# Patient Record
Sex: MALE | Race: WHITE | NOT HISPANIC OR LATINO | URBAN - METROPOLITAN AREA
[De-identification: names, ages, dates, MRNs, and addresses within clinical notes are randomized per-mention and may not be internally consistent; named-entity substitution may affect disease eponyms.]

---

## 2018-11-04 ENCOUNTER — HOSPITAL ENCOUNTER (OUTPATIENT)
Dept: URGENT CARE | Facility: CLINIC | Age: 28
Discharge: HOME | End: 2018-11-04
Attending: FAMILY MEDICINE | Admitting: FAMILY MEDICINE

## 2019-08-14 ENCOUNTER — OFFICE VISIT (OUTPATIENT)
Dept: FAMILY MEDICINE CLINIC | Facility: CLINIC | Age: 29
End: 2019-08-14

## 2019-08-14 VITALS
OXYGEN SATURATION: 98 % | HEART RATE: 58 BPM | SYSTOLIC BLOOD PRESSURE: 124 MMHG | HEIGHT: 72 IN | DIASTOLIC BLOOD PRESSURE: 84 MMHG | BODY MASS INDEX: 22.48 KG/M2 | RESPIRATION RATE: 16 BRPM | TEMPERATURE: 98.3 F | WEIGHT: 166 LBS

## 2019-08-14 DIAGNOSIS — K13.70 ORAL LESION: Primary | ICD-10-CM

## 2019-08-14 PROBLEM — R00.2 PALPITATIONS: Status: ACTIVE | Noted: 2018-11-12

## 2019-08-14 PROBLEM — J30.2 SEASONAL ALLERGIES: Status: ACTIVE | Noted: 2018-12-04

## 2019-08-14 PROBLEM — J45.909 ASTHMA: Status: ACTIVE | Noted: 2018-12-04

## 2019-08-14 PROBLEM — R06.02 EXERTIONAL SHORTNESS OF BREATH: Status: ACTIVE | Noted: 2018-11-12

## 2019-08-14 PROCEDURE — 99213 OFFICE O/P EST LOW 20 MIN: CPT | Performed by: NURSE PRACTITIONER

## 2019-08-14 RX ORDER — MONTELUKAST SODIUM 10 MG/1
TABLET ORAL
Refills: 11 | COMMUNITY
Start: 2019-08-10 | End: 2019-12-01 | Stop reason: SDUPTHER

## 2019-08-14 RX ORDER — ALBUTEROL SULFATE 90 UG/1
AEROSOL, METERED RESPIRATORY (INHALATION)
Refills: 9 | COMMUNITY
Start: 2019-08-10 | End: 2019-09-01 | Stop reason: SDUPTHER

## 2019-08-14 RX ORDER — FAMCICLOVIR 500 MG/1
500 TABLET ORAL 3 TIMES DAILY
Qty: 21 TABLET | Refills: 1 | Status: SHIPPED | OUTPATIENT
Start: 2019-08-14 | End: 2019-08-21

## 2019-08-14 RX ORDER — BUDESONIDE AND FORMOTEROL FUMARATE DIHYDRATE 160; 4.5 UG/1; UG/1
AEROSOL RESPIRATORY (INHALATION)
Refills: 11 | COMMUNITY
Start: 2019-08-10 | End: 2020-01-03

## 2019-08-14 NOTE — PROGRESS NOTES
Chief Complaint   Patient presents with   • Mouth Lesions        Subjective   Arnav Styles is a 29 y.o. male who presents today for pt has lesion on his tongue x 1 week     HPI:  Pt has lesions on central tongue x 1 week.  He takes drops for annual instead of injection due to his high number of allergies.  He has no other issues.   He has stopped the allergy shot because of the lesion.  He is worried due to father family history of oral cancer.  He uses snuff.       Arnav Styles  has a past medical history of Abnormal Holter monitor finding, Asthma, and Palpitations.    No Known Allergies    Current Outpatient Medications:   •  albuterol sulfate  (90 Base) MCG/ACT inhaler, USE 2 PUFFS Q 4 H PRN FOR SHORTNESS OF AIR, Disp: , Rfl: 9  •  montelukast (SINGULAIR) 10 MG tablet, TK 1 T PO QPM, Disp: , Rfl: 11  •  SYMBICORT 160-4.5 MCG/ACT inhaler, USE 2 PUFFS BID, Disp: , Rfl: 11  •  famciclovir (FAMVIR) 500 MG tablet, Take 1 tablet by mouth 3 (Three) Times a Day for 7 days., Disp: 21 tablet, Rfl: 1  Past Medical History:   Diagnosis Date   • Abnormal Holter monitor finding    • Asthma    • Palpitations      Past Surgical History:   Procedure Laterality Date   • TONSILLECTOMY       Social History     Socioeconomic History   • Marital status: Single     Spouse name: Not on file   • Number of children: Not on file   • Years of education: Not on file   • Highest education level: Not on file   Tobacco Use   • Smoking status: Never Smoker   • Smokeless tobacco: Former User   Substance and Sexual Activity   • Alcohol use: No     Frequency: Never   • Drug use: No     Family History   Problem Relation Age of Onset   • Throat cancer Father        Family history, surgical history, past medical history, Allergies and med's reviewed with patient today and updated in [facility] EMR.   PHQ-2 Depression Screening  Little interest or pleasure in doing things? 0   Feeling down, depressed, or hopeless? 0   PHQ-2 Total Score 0   PHQ-9  "Depression Screening  Little interest or pleasure in doing things? 0   Feeling down, depressed, or hopeless? 0   Trouble falling or staying asleep, or sleeping too much?     Feeling tired or having little energy?     Poor appetite or overeating?     Feeling bad about yourself - or that you are a failure or have let yourself or your family down?     Trouble concentrating on things, such as reading the newspaper or watching television?     Moving or speaking so slowly that other people could have noticed? Or the opposite - being so fidgety or restless that you have been moving around a lot more than usual?     Thoughts that you would be better off dead, or of hurting yourself in some way?     PHQ-9 Total Score 0   If you checked off any problems, how difficult have these problems made it for you to do your work, take care of things at home, or get along with other people?       ROS:  Review of Systems   Constitutional: Negative for fatigue and fever.   HENT: Positive for mouth sores. Negative for ear discharge, sinus pain and sore throat.    Respiratory: Negative for cough.    Cardiovascular: Negative for chest pain.   Gastrointestinal: Negative for abdominal pain.       OBJECTIVE:  Vitals:    08/14/19 0820   BP: 124/84   BP Location: Left arm   Patient Position: Sitting   Cuff Size: Adult   Pulse: 58   Resp: 16   Temp: 98.3 °F (36.8 °C)   TempSrc: Oral   SpO2: 98%   Weight: 75.3 kg (166 lb)   Height: 181.6 cm (71.5\")     Physical Exam   Constitutional: He appears well-developed.   HENT:   Head: Normocephalic.   Right Ear: Hearing, tympanic membrane and ear canal normal.   Left Ear: Hearing, tympanic membrane and ear canal normal.   Mouth/Throat: Uvula is midline and mucous membranes are normal.   Central tongue with 2 3 mm lesions white   Eyes: Conjunctivae are normal. Pupils are equal, round, and reactive to light.   Neck: Normal range of motion. Neck supple.   Cardiovascular: Regular rhythm and normal heart sounds. " Exam reveals no gallop and no friction rub.   No murmur heard.  Pulmonary/Chest: Effort normal and breath sounds normal. He has no wheezes. He has no rales.   Abdominal: Soft. Bowel sounds are normal. He exhibits no distension and no mass. There is no tenderness. There is no guarding.       ASSESSMENT/ PLAN:    Arnav was seen today for mouth lesions.    Diagnoses and all orders for this visit:    Oral lesion  Comments:  will try famivir  needs to let allergist know about area and stopping meds  if not better 2 weeks oral surg consult    Other orders  -     famciclovir (FAMVIR) 500 MG tablet; Take 1 tablet by mouth 3 (Three) Times a Day for 7 days.        Orders Placed Today:     New Medications Ordered This Visit   Medications   • famciclovir (FAMVIR) 500 MG tablet     Sig: Take 1 tablet by mouth 3 (Three) Times a Day for 7 days.     Dispense:  21 tablet     Refill:  1        Management Plan:     An After Visit Summary was printed and given to the patient at discharge.    Follow-up: Return in about 1 year (around 8/14/2020).    CHIKA Cote 8/14/2019 11:12 AM  This note was electronically signed.

## 2019-09-25 ENCOUNTER — OFFICE VISIT (OUTPATIENT)
Dept: FAMILY MEDICINE CLINIC | Facility: CLINIC | Age: 29
End: 2019-09-25

## 2019-09-25 VITALS
BODY MASS INDEX: 23.16 KG/M2 | SYSTOLIC BLOOD PRESSURE: 118 MMHG | HEIGHT: 72 IN | OXYGEN SATURATION: 97 % | RESPIRATION RATE: 16 BRPM | HEART RATE: 74 BPM | WEIGHT: 171 LBS | DIASTOLIC BLOOD PRESSURE: 81 MMHG | TEMPERATURE: 98.1 F

## 2019-09-25 DIAGNOSIS — B35.9 TINEA: Primary | ICD-10-CM

## 2019-09-25 PROCEDURE — 99212 OFFICE O/P EST SF 10 MIN: CPT | Performed by: NURSE PRACTITIONER

## 2019-09-25 NOTE — PROGRESS NOTES
Chief Complaint   Patient presents with   • Rash     on abdomen        Subjective   Arnav Styles is a 29 y.o. male who presents today for rash    HPI:  Pt has a rash on abd where he wears his vest and sweats.  It itches.  He had one on his side a while back but it resolved.  He has been using benadryl cream on it.   No other symptoms    Arnav Styles  has a past medical history of Abnormal Holter monitor finding, Asthma, and Palpitations.    No Known Allergies    Current Outpatient Medications:   •  montelukast (SINGULAIR) 10 MG tablet, TK 1 T PO QPM, Disp: , Rfl: 11  •  PROAIR  (90 Base) MCG/ACT inhaler, INHALE 2 PUFFS BY MOUTH EVERY 4 TO 6 HOURS AS NEEDED FOR SHORTNESS OF BREATH., Disp: 8.5 g, Rfl: 11  •  SYMBICORT 160-4.5 MCG/ACT inhaler, USE 2 PUFFS BID, Disp: , Rfl: 11  Past Medical History:   Diagnosis Date   • Abnormal Holter monitor finding    • Asthma    • Palpitations      Past Surgical History:   Procedure Laterality Date   • TONSILLECTOMY       Social History     Socioeconomic History   • Marital status: Single     Spouse name: Not on file   • Number of children: Not on file   • Years of education: Not on file   • Highest education level: Not on file   Tobacco Use   • Smoking status: Never Smoker   • Smokeless tobacco: Former User   Substance and Sexual Activity   • Alcohol use: No     Frequency: Never   • Drug use: No     Family History   Problem Relation Age of Onset   • Throat cancer Father        Family history, surgical history, past medical history, Allergies and med's reviewed with patient today and updated in [facility] EMR.   PHQ-2 Depression Screening  Little interest or pleasure in doing things?     Feeling down, depressed, or hopeless?     PHQ-2 Total Score     PHQ-9 Depression Screening  Little interest or pleasure in doing things?     Feeling down, depressed, or hopeless?     Trouble falling or staying asleep, or sleeping too much?     Feeling tired or having little energy?     Poor  "appetite or overeating?     Feeling bad about yourself - or that you are a failure or have let yourself or your family down?     Trouble concentrating on things, such as reading the newspaper or watching television?     Moving or speaking so slowly that other people could have noticed? Or the opposite - being so fidgety or restless that you have been moving around a lot more than usual?     Thoughts that you would be better off dead, or of hurting yourself in some way?     PHQ-9 Total Score     If you checked off any problems, how difficult have these problems made it for you to do your work, take care of things at home, or get along with other people?       ROS:  Review of Systems   Constitutional: Negative for fatigue and fever.   Skin: Positive for rash.       OBJECTIVE:  Vitals:    09/25/19 1027   BP: 118/81   BP Location: Right arm   Patient Position: Sitting   Cuff Size: Adult   Pulse: 74   Resp: 16   Temp: 98.1 °F (36.7 °C)   TempSrc: Oral   SpO2: 97%   Weight: 77.6 kg (171 lb)   Height: 181.6 cm (71.5\")     Physical Exam   Skin: Skin is warm and dry.   3 inch [place] pink raise rash   Nursing note and vitals reviewed.      ASSESSMENT/ PLAN:    Arnav was seen today for rash.    Diagnoses and all orders for this visit:    Tinea  Comments:  lotrimin bid        Orders Placed Today:     No orders of the defined types were placed in this encounter.       Management Plan:     An After Visit Summary was printed and given to the patient at discharge.    Follow-up: Return if symptoms worsen or fail to improve.    CHIKA Cote 9/25/2019 10:57 AM  This note was electronically signed.    "

## 2019-11-13 ENCOUNTER — OFFICE VISIT (OUTPATIENT)
Dept: FAMILY MEDICINE CLINIC | Facility: CLINIC | Age: 29
End: 2019-11-13

## 2019-11-13 VITALS
TEMPERATURE: 98 F | BODY MASS INDEX: 23.7 KG/M2 | WEIGHT: 175 LBS | DIASTOLIC BLOOD PRESSURE: 89 MMHG | HEART RATE: 70 BPM | OXYGEN SATURATION: 97 % | SYSTOLIC BLOOD PRESSURE: 134 MMHG | HEIGHT: 72 IN | RESPIRATION RATE: 16 BRPM

## 2019-11-13 DIAGNOSIS — R53.83 LOW ENERGY: ICD-10-CM

## 2019-11-13 DIAGNOSIS — F43.21 GRIEF: ICD-10-CM

## 2019-11-13 DIAGNOSIS — R53.83 OTHER FATIGUE: Primary | ICD-10-CM

## 2019-11-13 DIAGNOSIS — F41.9 ANXIETY: ICD-10-CM

## 2019-11-13 PROCEDURE — 99213 OFFICE O/P EST LOW 20 MIN: CPT | Performed by: NURSE PRACTITIONER

## 2019-11-13 RX ORDER — BUSPIRONE HYDROCHLORIDE 5 MG/1
5 TABLET ORAL 2 TIMES DAILY
Qty: 60 TABLET | Refills: 1 | Status: SHIPPED | OUTPATIENT
Start: 2019-11-13 | End: 2020-02-10

## 2019-11-13 NOTE — PROGRESS NOTES
Chief Complaint   Patient presents with   • Fatigue   • Insomnia        Subjective   Arnav Styles is a 29 y.o. male who presents today for low energy and anxiety    HPI:  Pt has notice that he does not have the energy level that he normally does over the past few month.  Last labs were over one yr ago.  He is still working out and eating healthy.  He travels and swings shift a lot.   He will like labs to check ordered.    Pt also has been having some increase in anxiety after losing his father and grandfather in recent months.  He is doing better than he was 6 months ago.  But he has anxiety in crowds or when he has a day off.  He would like to try something for anxiety that he has little side effects.       Arnav Styles  has a past medical history of Abnormal Holter monitor finding, Asthma, and Palpitations.    No Known Allergies    Current Outpatient Medications:   •  montelukast (SINGULAIR) 10 MG tablet, TK 1 T PO QPM, Disp: , Rfl: 11  •  PROAIR  (90 Base) MCG/ACT inhaler, INHALE 2 PUFFS BY MOUTH EVERY 4 TO 6 HOURS AS NEEDED FOR SHORTNESS OF BREATH., Disp: 8.5 g, Rfl: 11  •  SYMBICORT 160-4.5 MCG/ACT inhaler, USE 2 PUFFS BID, Disp: , Rfl: 11  •  busPIRone (BUSPAR) 5 MG tablet, Take 1 tablet by mouth 2 (Two) Times a Day., Disp: 60 tablet, Rfl: 1  Past Medical History:   Diagnosis Date   • Abnormal Holter monitor finding    • Asthma    • Palpitations      Past Surgical History:   Procedure Laterality Date   • TONSILLECTOMY       Social History     Socioeconomic History   • Marital status: Single     Spouse name: Not on file   • Number of children: Not on file   • Years of education: Not on file   • Highest education level: Not on file   Tobacco Use   • Smoking status: Never Smoker   • Smokeless tobacco: Former User   Substance and Sexual Activity   • Alcohol use: No     Frequency: Never   • Drug use: No     Family History   Problem Relation Age of Onset   • Throat cancer Father        Family history,  "surgical history, past medical history, Allergies and med's reviewed with patient today and updated in [facility] EMR.   PHQ-2 Depression Screening  Little interest or pleasure in doing things?     Feeling down, depressed, or hopeless?     PHQ-2 Total Score     PHQ-9 Depression Screening  Little interest or pleasure in doing things?     Feeling down, depressed, or hopeless?     Trouble falling or staying asleep, or sleeping too much?     Feeling tired or having little energy?     Poor appetite or overeating?     Feeling bad about yourself - or that you are a failure or have let yourself or your family down?     Trouble concentrating on things, such as reading the newspaper or watching television?     Moving or speaking so slowly that other people could have noticed? Or the opposite - being so fidgety or restless that you have been moving around a lot more than usual?     Thoughts that you would be better off dead, or of hurting yourself in some way?     PHQ-9 Total Score     If you checked off any problems, how difficult have these problems made it for you to do your work, take care of things at home, or get along with other people?       ROS:  Review of Systems   Constitutional: Positive for fatigue. Negative for chills and fever.   Respiratory: Negative for cough and shortness of breath.    Cardiovascular: Negative for chest pain.   Psychiatric/Behavioral: The patient is nervous/anxious.        OBJECTIVE:  Vitals:    11/13/19 1337   BP: 134/89   BP Location: Left arm   Patient Position: Sitting   Cuff Size: Adult   Pulse: 70   Resp: 16   Temp: 98 °F (36.7 °C)   TempSrc: Oral   SpO2: 97%   Weight: 79.4 kg (175 lb)   Height: 181.6 cm (71.5\")     Physical Exam   Cardiovascular: Normal rate, regular rhythm, normal heart sounds and intact distal pulses. Exam reveals no gallop and no friction rub.   No murmur heard.  Pulmonary/Chest: Effort normal and breath sounds normal. He has no wheezes.   Abdominal: Soft. Bowel sounds " are normal.   Skin: Skin is warm and dry.   Psychiatric: He has a normal mood and affect. His behavior is normal. Judgment and thought content normal.   Nursing note and vitals reviewed.      ASSESSMENT/ PLAN:    Arnav was seen today for fatigue and insomnia.    Diagnoses and all orders for this visit:    Other fatigue  -     CBC Auto Differential; Future  -     Comprehensive Metabolic Panel; Future  -     TSH; Future  -     Testosterone; Future    Grief    Anxiety    Low energy  -     CBC Auto Differential; Future  -     Comprehensive Metabolic Panel; Future  -     TSH; Future  -     Testosterone; Future    Other orders  -     busPIRone (BUSPAR) 5 MG tablet; Take 1 tablet by mouth 2 (Two) Times a Day.        Orders Placed Today:     New Medications Ordered This Visit   Medications   • busPIRone (BUSPAR) 5 MG tablet     Sig: Take 1 tablet by mouth 2 (Two) Times a Day.     Dispense:  60 tablet     Refill:  1        Management Plan:     An After Visit Summary was printed and given to the patient at discharge.    Follow-up: Return in about 4 weeks (around 12/11/2019), or if symptoms worsen or fail to improve.    Christel Garcia, CHIKA 11/13/2019 2:27 PM  This note was electronically signed.

## 2019-11-17 LAB
ALBUMIN SERPL-MCNC: 4.6 G/DL (ref 3.5–5.5)
ALBUMIN/GLOB SERPL: 2.4 {RATIO} (ref 1.2–2.2)
ALP SERPL-CCNC: 82 IU/L (ref 39–117)
ALT SERPL-CCNC: 21 IU/L (ref 0–44)
AST SERPL-CCNC: 15 IU/L (ref 0–40)
BASOPHILS # BLD AUTO: 0 X10E3/UL (ref 0–0.2)
BASOPHILS NFR BLD AUTO: 1 %
BILIRUB SERPL-MCNC: 1.1 MG/DL (ref 0–1.2)
BUN SERPL-MCNC: 17 MG/DL (ref 6–20)
BUN/CREAT SERPL: 14 (ref 9–20)
CALCIUM SERPL-MCNC: 9.4 MG/DL (ref 8.7–10.2)
CHLORIDE SERPL-SCNC: 103 MMOL/L (ref 96–106)
CO2 SERPL-SCNC: 23 MMOL/L (ref 20–29)
CREAT SERPL-MCNC: 1.18 MG/DL (ref 0.76–1.27)
EOSINOPHIL # BLD AUTO: 0.2 X10E3/UL (ref 0–0.4)
EOSINOPHIL NFR BLD AUTO: 3 %
ERYTHROCYTE [DISTWIDTH] IN BLOOD BY AUTOMATED COUNT: 12.7 % (ref 12.3–15.4)
GLOBULIN SER CALC-MCNC: 1.9 G/DL (ref 1.5–4.5)
GLUCOSE SERPL-MCNC: 101 MG/DL (ref 65–99)
HCT VFR BLD AUTO: 45.7 % (ref 37.5–51)
HGB BLD-MCNC: 16.2 G/DL (ref 13–17.7)
IMM GRANULOCYTES # BLD AUTO: 0 X10E3/UL (ref 0–0.1)
IMM GRANULOCYTES NFR BLD AUTO: 0 %
LYMPHOCYTES # BLD AUTO: 2.2 X10E3/UL (ref 0.7–3.1)
LYMPHOCYTES NFR BLD AUTO: 39 %
MCH RBC QN AUTO: 30.5 PG (ref 26.6–33)
MCHC RBC AUTO-ENTMCNC: 35.4 G/DL (ref 31.5–35.7)
MCV RBC AUTO: 86 FL (ref 79–97)
MONOCYTES # BLD AUTO: 0.5 X10E3/UL (ref 0.1–0.9)
MONOCYTES NFR BLD AUTO: 9 %
NEUTROPHILS # BLD AUTO: 2.7 X10E3/UL (ref 1.4–7)
NEUTROPHILS NFR BLD AUTO: 48 %
PLATELET # BLD AUTO: 207 X10E3/UL (ref 150–450)
POTASSIUM SERPL-SCNC: 4.2 MMOL/L (ref 3.5–5.2)
PROT SERPL-MCNC: 6.5 G/DL (ref 6–8.5)
RBC # BLD AUTO: 5.31 X10E6/UL (ref 4.14–5.8)
SODIUM SERPL-SCNC: 141 MMOL/L (ref 134–144)
TESTOST SERPL-MCNC: 363 NG/DL (ref 264–916)
TSH SERPL DL<=0.005 MIU/L-ACNC: 2.89 UIU/ML (ref 0.45–4.5)
WBC # BLD AUTO: 5.6 X10E3/UL (ref 3.4–10.8)

## 2019-11-18 ENCOUNTER — RESULTS ENCOUNTER (OUTPATIENT)
Dept: FAMILY MEDICINE CLINIC | Facility: CLINIC | Age: 29
End: 2019-11-18

## 2019-11-18 DIAGNOSIS — R53.83 LOW ENERGY: ICD-10-CM

## 2019-11-18 DIAGNOSIS — R53.83 OTHER FATIGUE: ICD-10-CM

## 2019-12-02 RX ORDER — MONTELUKAST SODIUM 10 MG/1
TABLET ORAL
Qty: 30 TABLET | Refills: 11 | Status: SHIPPED | OUTPATIENT
Start: 2019-12-02 | End: 2021-05-06

## 2020-01-03 RX ORDER — BUDESONIDE AND FORMOTEROL FUMARATE DIHYDRATE 160; 4.5 UG/1; UG/1
AEROSOL RESPIRATORY (INHALATION)
Qty: 10.2 G | Refills: 11 | Status: SHIPPED | OUTPATIENT
Start: 2020-01-03 | End: 2023-01-25

## 2020-02-10 RX ORDER — BUSPIRONE HYDROCHLORIDE 5 MG/1
TABLET ORAL
Qty: 60 TABLET | Refills: 1 | Status: SHIPPED | OUTPATIENT
Start: 2020-02-10 | End: 2020-06-08

## 2020-02-10 RX ORDER — ALBUTEROL SULFATE 90 UG/1
AEROSOL, METERED RESPIRATORY (INHALATION)
Qty: 18 G | Refills: 5 | Status: SHIPPED | OUTPATIENT
Start: 2020-02-10 | End: 2023-01-25

## 2020-02-24 ENCOUNTER — TELEPHONE (OUTPATIENT)
Dept: FAMILY MEDICINE CLINIC | Facility: CLINIC | Age: 30
End: 2020-02-24

## 2020-02-24 DIAGNOSIS — J45.40 MODERATE PERSISTENT ASTHMA, UNSPECIFIED WHETHER COMPLICATED: Primary | ICD-10-CM

## 2020-02-24 NOTE — TELEPHONE ENCOUNTER
Patient says that he is currently seeing an Asthma and Allergy specialist and he's not doing much for him. He says that his sister just went through the same thing and the Pulmonologist really helped her. So he would like to do see Dr. Piedra.

## 2020-02-24 NOTE — TELEPHONE ENCOUNTER
I am okay with doing a referral but I would suggest may be a asthma and allergy specialist.  If he wants to see pulmonary would be a longer period of time and I think that asthma allergy provider would be a better fit for his symptoms.  Let me know what he says and I will put the order in?

## 2020-06-08 RX ORDER — BUSPIRONE HYDROCHLORIDE 5 MG/1
TABLET ORAL
Qty: 60 TABLET | Refills: 11 | Status: SHIPPED | OUTPATIENT
Start: 2020-06-08 | End: 2021-05-06

## 2020-08-24 ENCOUNTER — OFFICE VISIT (OUTPATIENT)
Dept: FAMILY MEDICINE CLINIC | Facility: CLINIC | Age: 30
End: 2020-08-24

## 2020-08-24 VITALS
RESPIRATION RATE: 16 BRPM | HEIGHT: 72 IN | WEIGHT: 160 LBS | DIASTOLIC BLOOD PRESSURE: 80 MMHG | TEMPERATURE: 98.9 F | HEART RATE: 77 BPM | OXYGEN SATURATION: 99 % | BODY MASS INDEX: 21.67 KG/M2 | SYSTOLIC BLOOD PRESSURE: 119 MMHG

## 2020-08-24 DIAGNOSIS — L25.9 CONTACT DERMATITIS, UNSPECIFIED CONTACT DERMATITIS TYPE, UNSPECIFIED TRIGGER: Primary | ICD-10-CM

## 2020-08-24 PROCEDURE — 99213 OFFICE O/P EST LOW 20 MIN: CPT | Performed by: NURSE PRACTITIONER

## 2020-08-24 RX ORDER — TRIAMCINOLONE ACETONIDE 0.25 MG/G
OINTMENT TOPICAL 2 TIMES DAILY
Qty: 80 G | Refills: 1 | Status: SHIPPED | OUTPATIENT
Start: 2020-08-24 | End: 2023-01-25

## 2020-08-24 RX ORDER — PREDNISONE 10 MG/1
TABLET ORAL
Qty: 12 TABLET | Refills: 0 | Status: SHIPPED | OUTPATIENT
Start: 2020-08-24 | End: 2022-12-14

## 2020-08-24 NOTE — PROGRESS NOTES
Chief Complaint   Patient presents with   • Rash     Left Ankle with edema        Subjective   Arnav Styles is a 30 y.o. male who presents today for rash and swelling    HPI: Patient is here after developing a rash and swelling where his boot was yesterday.  He was in a heavily wooded area yesterday trying to get his horses.  He developed itching and this rash with some swelling right away.  He has no open lesions or insect bites that he is aware of.  He has no fever and the rash is not spreading at this point.     Arnav Styles  has a past medical history of Abnormal Holter monitor finding, Asthma, and Palpitations.    No Known Allergies    Current Outpatient Medications:   •  busPIRone (BUSPAR) 5 MG tablet, TAKE 1 TABLET BY MOUTH TWICE DAILY, Disp: 60 tablet, Rfl: 11  •  ALBUTEROL SULFATE  (90 Base) MCG/ACT inhaler, USE 2 PUFFS EVERY 4 HOURS AS NEEDED FOR SHORTNESS OF AIR, Disp: 18 g, Rfl: 5  •  montelukast (SINGULAIR) 10 MG tablet, TAKE 1 TABLET BY MOUTH EVERY EVENING, Disp: 30 tablet, Rfl: 11  •  predniSONE (DELTASONE) 10 MG tablet, Day 1 and 2 - 3 tabs, Day 3 and 4 - 2 tabs, Day 5 and 6 - 1 tabs, Disp: 12 tablet, Rfl: 0  •  SYMBICORT 160-4.5 MCG/ACT inhaler, USE 2 PUFFS TWICE DAILY, Disp: 10.2 g, Rfl: 11  •  triamcinolone (KENALOG) 0.025 % ointment, Apply  topically to the appropriate area as directed 2 (Two) Times a Day., Disp: 80 g, Rfl: 1  Past Medical History:   Diagnosis Date   • Abnormal Holter monitor finding    • Asthma    • Palpitations      Past Surgical History:   Procedure Laterality Date   • TONSILLECTOMY       Social History     Socioeconomic History   • Marital status: Single     Spouse name: Not on file   • Number of children: Not on file   • Years of education: Not on file   • Highest education level: Not on file   Tobacco Use   • Smoking status: Never Smoker   • Smokeless tobacco: Former User   Substance and Sexual Activity   • Alcohol use: No     Frequency: Never   • Drug use: No  "    Family History   Problem Relation Age of Onset   • Throat cancer Father        Family history, surgical history, past medical history, Allergies and med's reviewed with patient today and updated in [facility] EMR.   PHQ-2 Depression Screening  Little interest or pleasure in doing things? 0   Feeling down, depressed, or hopeless? 0   PHQ-2 Total Score 0   PHQ-9 Depression Screening  Little interest or pleasure in doing things? 0   Feeling down, depressed, or hopeless? 0   Trouble falling or staying asleep, or sleeping too much?     Feeling tired or having little energy?     Poor appetite or overeating?     Feeling bad about yourself - or that you are a failure or have let yourself or your family down?     Trouble concentrating on things, such as reading the newspaper or watching television?     Moving or speaking so slowly that other people could have noticed? Or the opposite - being so fidgety or restless that you have been moving around a lot more than usual?     Thoughts that you would be better off dead, or of hurting yourself in some way?     PHQ-9 Total Score 0   If you checked off any problems, how difficult have these problems made it for you to do your work, take care of things at home, or get along with other people?       ROS:  Review of Systems   Constitutional: Negative for fatigue and fever.   Skin: Positive for color change and rash.       OBJECTIVE:  Vitals:    08/24/20 1059   BP: 119/80   BP Location: Right arm   Patient Position: Sitting   Cuff Size: Adult   Pulse: 77   Resp: 16   Temp: 98.9 °F (37.2 °C)   TempSrc: Temporal   SpO2: 99%   Weight: 72.6 kg (160 lb)   Height: 181.6 cm (71.5\")     Physical Exam   Pulmonary/Chest: Effort normal.   Musculoskeletal: Normal range of motion. He exhibits no tenderness.   Skin: Skin is warm and dry. Rash noted.   Left ankle and foot with a red raised rash and some mild edema in ankle.  No vesicular lesions and no open wounds   Nursing note and vitals " reviewed.      ASSESSMENT/ PLAN:    Arnav was seen today for rash.    Diagnoses and all orders for this visit:    Contact dermatitis, unspecified contact dermatitis type, unspecified trigger  Comments:  Discussed risk and benefits of medications.  If increasing redness may need antibiotic.  Discussed prednisone.  Follow-up with problems    Other orders  -     predniSONE (DELTASONE) 10 MG tablet; Day 1 and 2 - 3 tabs, Day 3 and 4 - 2 tabs, Day 5 and 6 - 1 tabs  -     triamcinolone (KENALOG) 0.025 % ointment; Apply  topically to the appropriate area as directed 2 (Two) Times a Day.        Orders Placed Today:     New Medications Ordered This Visit   Medications   • predniSONE (DELTASONE) 10 MG tablet     Sig: Day 1 and 2 - 3 tabs, Day 3 and 4 - 2 tabs, Day 5 and 6 - 1 tabs     Dispense:  12 tablet     Refill:  0   • triamcinolone (KENALOG) 0.025 % ointment     Sig: Apply  topically to the appropriate area as directed 2 (Two) Times a Day.     Dispense:  80 g     Refill:  1        Management Plan:     An After Visit Summary was printed and given to the patient at discharge.    Follow-up: Return if symptoms worsen or fail to improve.    CHIKA Cote 8/24/2020 11:54  This note was electronically signed.

## 2021-05-06 RX ORDER — MONTELUKAST SODIUM 10 MG/1
TABLET ORAL
Qty: 30 TABLET | Refills: 11 | Status: SHIPPED | OUTPATIENT
Start: 2021-05-06 | End: 2023-01-25

## 2021-05-06 RX ORDER — BUSPIRONE HYDROCHLORIDE 5 MG/1
TABLET ORAL
Qty: 60 TABLET | Refills: 11 | Status: SHIPPED | OUTPATIENT
Start: 2021-05-06 | End: 2023-01-25

## 2021-11-02 ENCOUNTER — OFFICE VISIT (OUTPATIENT)
Dept: FAMILY MEDICINE CLINIC | Facility: CLINIC | Age: 31
End: 2021-11-02

## 2021-11-02 VITALS
OXYGEN SATURATION: 98 % | BODY MASS INDEX: 24.36 KG/M2 | SYSTOLIC BLOOD PRESSURE: 136 MMHG | WEIGHT: 174 LBS | TEMPERATURE: 97.6 F | DIASTOLIC BLOOD PRESSURE: 85 MMHG | HEIGHT: 71 IN | HEART RATE: 68 BPM | RESPIRATION RATE: 18 BRPM

## 2021-11-02 DIAGNOSIS — R79.89 LOW TESTOSTERONE IN MALE: ICD-10-CM

## 2021-11-02 DIAGNOSIS — R53.83 OTHER FATIGUE: ICD-10-CM

## 2021-11-02 DIAGNOSIS — E03.9 ACQUIRED HYPOTHYROIDISM: ICD-10-CM

## 2021-11-02 DIAGNOSIS — R53.83 LOW ENERGY: Primary | ICD-10-CM

## 2021-11-02 PROCEDURE — 99213 OFFICE O/P EST LOW 20 MIN: CPT | Performed by: NURSE PRACTITIONER

## 2021-11-02 NOTE — PROGRESS NOTES
Chief Complaint  Med Refill (discuss meds )    Subjective          Arnav Styles presents to [facility] for decreased energy level and libido.    History of Present Illness    Patient reports he has decreased energy level is wondering if his BuSpar. He only takes it once a day. Does help with his anxiety a great deal. His asthma is under control. He is not very motivated to do some things. He is asking about having a testosterone level. He is gaining weight but he is not exercising on a regular basis. 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 He does not feel depressed. He is getting ready to become a father. He  approximately 2 years ago. He is here to discuss his decreased libido and energy level.      Arnav Styles  has a past medical history of Abnormal Holter monitor finding, Asthma, and Palpitations.      Review of Systems   Constitutional: Positive for fatigue.   HENT: Negative for ear pain, sinus pain and sore throat.    Eyes: Negative for pain.   Respiratory: Negative for cough and shortness of breath.    Cardiovascular: Negative for chest pain.   Gastrointestinal: Negative for abdominal pain, diarrhea and nausea.   Endocrine: Negative for polyuria.   Genitourinary: Negative for decreased urine volume and dysuria.   Musculoskeletal: Negative for arthralgias.   Skin: Negative for rash.   Allergic/Immunologic: Negative for environmental allergies.   Neurological: Negative for dizziness, numbness and headaches.   Hematological: Does not bruise/bleed easily.   Psychiatric/Behavioral: The patient is not nervous/anxious.         Objective       Current Outpatient Medications:   •  busPIRone (BUSPAR) 5 MG tablet, TAKE 1 TABLET BY MOUTH TWICE DAILY, Disp: 60 tablet, Rfl: 11  •  montelukast (SINGULAIR) 10 MG tablet, TAKE 1 TABLET BY MOUTH EVERY EVENING, Disp: 30 tablet,  "Rfl: 11  •  ALBUTEROL SULFATE  (90 Base) MCG/ACT inhaler, USE 2 PUFFS EVERY 4 HOURS AS NEEDED FOR SHORTNESS OF AIR, Disp: 18 g, Rfl: 5  •  predniSONE (DELTASONE) 10 MG tablet, Day 1 and 2 - 3 tabs, Day 3 and 4 - 2 tabs, Day 5 and 6 - 1 tabs, Disp: 12 tablet, Rfl: 0  •  SYMBICORT 160-4.5 MCG/ACT inhaler, USE 2 PUFFS TWICE DAILY, Disp: 10.2 g, Rfl: 11  •  triamcinolone (KENALOG) 0.025 % ointment, Apply  topically to the appropriate area as directed 2 (Two) Times a Day., Disp: 80 g, Rfl: 1    Vital Signs:      /85 (BP Location: Right arm, Patient Position: Sitting, Cuff Size: Adult)   Pulse 68   Temp 97.6 °F (36.4 °C) (Infrared)   Resp 18   Ht 181.6 cm (71.5\")   Wt 78.9 kg (174 lb)   SpO2 98%   BMI 23.93 kg/m²     Vitals:    11/02/21 1452   BP: 136/85   BP Location: Right arm   Patient Position: Sitting   Cuff Size: Adult   Pulse: 68   Resp: 18   Temp: 97.6 °F (36.4 °C)   TempSrc: Infrared   SpO2: 98%   Weight: 78.9 kg (174 lb)   Height: 181.6 cm (71.5\")      Physical Exam  Vitals and nursing note reviewed.   Constitutional:       Appearance: He is well-developed.   Cardiovascular:      Rate and Rhythm: Normal rate and regular rhythm.      Heart sounds: Normal heart sounds. No murmur heard.  No friction rub. No gallop.    Pulmonary:      Effort: Pulmonary effort is normal.      Breath sounds: Normal breath sounds. No wheezing or rales.   Abdominal:      General: Bowel sounds are normal.      Palpations: Abdomen is soft.      Tenderness: There is no abdominal tenderness.   Skin:     General: Skin is warm and dry.   Neurological:      Mental Status: He is alert.        Result Review :                PHQ-9 Depression Screening  Little interest or pleasure in doing things?     Feeling down, depressed, or hopeless?     Trouble falling or staying asleep, or sleeping too much?     Feeling tired or having little energy?     Poor appetite or overeating?     Feeling bad about yourself - or that you are a " failure or have let yourself or your family down?     Trouble concentrating on things, such as reading the newspaper or watching television?     Moving or speaking so slowly that other people could have noticed? Or the opposite - being so fidgety or restless that you have been moving around a lot more than usual?     Thoughts that you would be better off dead, or of hurting yourself in some way?     PHQ-9 Total Score     If you checked off any problems, how difficult have these problems made it for you to do your work, take care of things at home, or get along with other people?             Assessment and Plan    Diagnoses and all orders for this visit:    1. Low energy (Primary)  Comments:  Discussed labs. Will refer if testosterone level is low. Reassured BuSpar is not causing the problem.  Orders:  -     CBC & Differential  -     Comprehensive Metabolic Panel  -     TSH  -     Testosterone (Free & Total), LC / MS    2. Other fatigue  -     CBC & Differential  -     Comprehensive Metabolic Panel  -     TSH  -     Testosterone (Free & Total), LC / MS         Problem List Items Addressed This Visit        Active Problems    Other fatigue    Relevant Orders    CBC & Differential    Comprehensive Metabolic Panel    TSH    Testosterone (Free & Total), LC / MS      Other Visit Diagnoses     Low energy    -  Primary    Discussed labs. Will refer if testosterone level is low. Reassured BuSpar is not causing the problem.    Relevant Orders    CBC & Differential    Comprehensive Metabolic Panel    TSH    Testosterone (Free & Total), LC / MS          Follow Up   Return if symptoms worsen or fail to improve.  Patient was given instructions and counseling regarding his condition or for health maintenance advice. Please see specific information pulled into the AVS if appropriate.

## 2021-11-07 LAB
ALBUMIN SERPL-MCNC: 4.8 G/DL (ref 4–5)
ALBUMIN/GLOB SERPL: 2.2 {RATIO} (ref 1.2–2.2)
ALP SERPL-CCNC: 115 IU/L (ref 44–121)
ALT SERPL-CCNC: 58 IU/L (ref 0–44)
AST SERPL-CCNC: 27 IU/L (ref 0–40)
BASOPHILS # BLD AUTO: 0.1 X10E3/UL (ref 0–0.2)
BASOPHILS NFR BLD AUTO: 1 %
BILIRUB SERPL-MCNC: 1 MG/DL (ref 0–1.2)
BUN SERPL-MCNC: 15 MG/DL (ref 6–20)
BUN/CREAT SERPL: 12 (ref 9–20)
CALCIUM SERPL-MCNC: 9.6 MG/DL (ref 8.7–10.2)
CHLORIDE SERPL-SCNC: 102 MMOL/L (ref 96–106)
CO2 SERPL-SCNC: 24 MMOL/L (ref 20–29)
CREAT SERPL-MCNC: 1.21 MG/DL (ref 0.76–1.27)
EOSINOPHIL # BLD AUTO: 0.3 X10E3/UL (ref 0–0.4)
EOSINOPHIL NFR BLD AUTO: 6 %
ERYTHROCYTE [DISTWIDTH] IN BLOOD BY AUTOMATED COUNT: 12.5 % (ref 11.6–15.4)
GLOBULIN SER CALC-MCNC: 2.2 G/DL (ref 1.5–4.5)
GLUCOSE SERPL-MCNC: 71 MG/DL (ref 65–99)
HCT VFR BLD AUTO: 46 % (ref 37.5–51)
HGB BLD-MCNC: 16.1 G/DL (ref 13–17.7)
IMM GRANULOCYTES # BLD AUTO: 0 X10E3/UL (ref 0–0.1)
IMM GRANULOCYTES NFR BLD AUTO: 0 %
LYMPHOCYTES # BLD AUTO: 1.9 X10E3/UL (ref 0.7–3.1)
LYMPHOCYTES NFR BLD AUTO: 34 %
MCH RBC QN AUTO: 30.7 PG (ref 26.6–33)
MCHC RBC AUTO-ENTMCNC: 35 G/DL (ref 31.5–35.7)
MCV RBC AUTO: 88 FL (ref 79–97)
MONOCYTES # BLD AUTO: 0.4 X10E3/UL (ref 0.1–0.9)
MONOCYTES NFR BLD AUTO: 7 %
NEUTROPHILS # BLD AUTO: 2.9 X10E3/UL (ref 1.4–7)
NEUTROPHILS NFR BLD AUTO: 52 %
PLATELET # BLD AUTO: 234 X10E3/UL (ref 150–450)
POTASSIUM SERPL-SCNC: 4.2 MMOL/L (ref 3.5–5.2)
PROT SERPL-MCNC: 7 G/DL (ref 6–8.5)
RBC # BLD AUTO: 5.25 X10E6/UL (ref 4.14–5.8)
SODIUM SERPL-SCNC: 140 MMOL/L (ref 134–144)
TESTOST FREE SERPL-MCNC: 5.8 PG/ML (ref 8.7–25.1)
TESTOST SERPL-MCNC: 284.9 NG/DL (ref 264–916)
TSH SERPL DL<=0.005 MIU/L-ACNC: 4.52 UIU/ML (ref 0.45–4.5)
WBC # BLD AUTO: 5.6 X10E3/UL (ref 3.4–10.8)

## 2021-11-08 RX ORDER — LEVOTHYROXINE SODIUM 0.05 MG/1
50 TABLET ORAL DAILY
Qty: 90 TABLET | Refills: 0 | Status: SHIPPED | OUTPATIENT
Start: 2021-11-08 | End: 2022-02-01 | Stop reason: SDUPTHER

## 2021-11-09 LAB
Lab: NORMAL
T3 SERPL-MCNC: 132 NG/DL (ref 71–180)
T4 FREE SERPL-MCNC: 1.11 NG/DL (ref 0.82–1.77)
WRITTEN AUTHORIZATION: NORMAL

## 2021-11-10 ENCOUNTER — TELEPHONE (OUTPATIENT)
Dept: FAMILY MEDICINE CLINIC | Facility: CLINIC | Age: 31
End: 2021-11-10

## 2021-11-10 NOTE — TELEPHONE ENCOUNTER
I would like for him to have an appointment in 3 or 4 weeks and I will order the test after I see him.  Please set him up for an appointment

## 2021-11-10 NOTE — TELEPHONE ENCOUNTER
Caller: Arnav Styles    Relationship: Self    Best call back number: [phone]    What medications are you currently taking:   Current Outpatient Medications on File Prior to Visit   Medication Sig Dispense Refill   • ALBUTEROL SULFATE  (90 Base) MCG/ACT inhaler USE 2 PUFFS EVERY 4 HOURS AS NEEDED FOR SHORTNESS OF AIR 18 g 5   • busPIRone (BUSPAR) 5 MG tablet TAKE 1 TABLET BY MOUTH TWICE DAILY 60 tablet 11   • levothyroxine (Synthroid) 50 MCG tablet Take 1 tablet by mouth Daily. 90 tablet 0   • montelukast (SINGULAIR) 10 MG tablet TAKE 1 TABLET BY MOUTH EVERY EVENING 30 tablet 11   • predniSONE (DELTASONE) 10 MG tablet Day 1 and 2 - 3 tabs, Day 3 and 4 - 2 tabs, Day 5 and 6 - 1 tabs 12 tablet 0   • SYMBICORT 160-4.5 MCG/ACT inhaler USE 2 PUFFS TWICE DAILY 10.2 g 11   • triamcinolone (KENALOG) 0.025 % ointment Apply  topically to the appropriate area as directed 2 (Two) Times a Day. 80 g 1     No current facility-administered medications on file prior to visit.          When did you start taking these medications: 11/8/2021    Which medication are you concerned about: levothyroxine (Synthroid) 50 MCG tablet    Who prescribed you this medication: PAMELA FARR     What are your concerns: PATIENT STATED WHEN HE TOOK SYED MEDICATION IT MADE HIS EARS HOT AND BURN AND FELT IT ALL OVER ALMOST SWEATING .     How long have you had these concerns: 1-2 DAYS

## 2021-11-10 NOTE — TELEPHONE ENCOUNTER
That is an unusual reaction.  If it is very profound he does not want to take it, I would like to recheck his labs again.  Make him an appointment to follow back up with me in 3 to 4 weeks.  We will discuss options at that time.

## 2021-11-10 NOTE — TELEPHONE ENCOUNTER
Patient advised and made appt for 12/6/21 and will try to continue the meds given and if he still feels different he will stop taking it until appt.   88

## 2021-12-18 LAB
T3 SERPL-MCNC: 109 NG/DL (ref 71–180)
T4 FREE SERPL-MCNC: 1.51 NG/DL (ref 0.82–1.77)

## 2021-12-19 DIAGNOSIS — E03.9 ACQUIRED HYPOTHYROIDISM: Primary | ICD-10-CM

## 2021-12-20 ENCOUNTER — OFFICE VISIT (OUTPATIENT)
Dept: FAMILY MEDICINE CLINIC | Facility: CLINIC | Age: 31
End: 2021-12-20

## 2021-12-20 VITALS
TEMPERATURE: 97.9 F | RESPIRATION RATE: 16 BRPM | DIASTOLIC BLOOD PRESSURE: 79 MMHG | HEART RATE: 69 BPM | OXYGEN SATURATION: 97 % | SYSTOLIC BLOOD PRESSURE: 131 MMHG | HEIGHT: 72 IN | WEIGHT: 174 LBS | BODY MASS INDEX: 23.57 KG/M2

## 2021-12-20 DIAGNOSIS — E03.9 ACQUIRED HYPOTHYROIDISM: Primary | ICD-10-CM

## 2021-12-20 DIAGNOSIS — R79.89 LOW TESTOSTERONE IN MALE: ICD-10-CM

## 2021-12-20 PROCEDURE — 99213 OFFICE O/P EST LOW 20 MIN: CPT | Performed by: NURSE PRACTITIONER

## 2021-12-20 NOTE — PROGRESS NOTES
"Chief Complaint  Hypothyroidism (Follow up)    Subjective          Arnav Styles presents to [facility] for hypothyroidism    History of Present Illness    Patient is here to follow-up on new diagnosis of hypothyroidism.  He has no complaints or concerns other than the fatigue.  He reports the fatigue is better.  Has been referred to urology after a low testosterone level as well.  His TSH is pending.      Arnav Styles  has a past medical history of Abnormal Holter monitor finding, Asthma, and Palpitations.      Review of Systems   Constitutional: Negative for fatigue and fever.   Respiratory: Negative for cough and wheezing.    Psychiatric/Behavioral: The patient is not nervous/anxious.         Objective       Current Outpatient Medications:   •  levothyroxine (Synthroid) 50 MCG tablet, Take 1 tablet by mouth Daily., Disp: 90 tablet, Rfl: 0  •  ALBUTEROL SULFATE  (90 Base) MCG/ACT inhaler, USE 2 PUFFS EVERY 4 HOURS AS NEEDED FOR SHORTNESS OF AIR, Disp: 18 g, Rfl: 5  •  busPIRone (BUSPAR) 5 MG tablet, TAKE 1 TABLET BY MOUTH TWICE DAILY, Disp: 60 tablet, Rfl: 11  •  montelukast (SINGULAIR) 10 MG tablet, TAKE 1 TABLET BY MOUTH EVERY EVENING, Disp: 30 tablet, Rfl: 11  •  predniSONE (DELTASONE) 10 MG tablet, Day 1 and 2 - 3 tabs, Day 3 and 4 - 2 tabs, Day 5 and 6 - 1 tabs, Disp: 12 tablet, Rfl: 0  •  SYMBICORT 160-4.5 MCG/ACT inhaler, USE 2 PUFFS TWICE DAILY, Disp: 10.2 g, Rfl: 11  •  triamcinolone (KENALOG) 0.025 % ointment, Apply  topically to the appropriate area as directed 2 (Two) Times a Day., Disp: 80 g, Rfl: 1    Vital Signs:      /79 (BP Location: Right arm, Patient Position: Sitting, Cuff Size: Adult)   Pulse 69   Temp 97.9 °F (36.6 °C) (Infrared)   Resp 16   Ht 181.6 cm (71.5\")   Wt 78.9 kg (174 lb)   SpO2 97%   BMI 23.93 kg/m²     Vitals:    12/20/21 0832   BP: 131/79   BP Location: Right arm   Patient Position: Sitting   Cuff Size: Adult   Pulse: 69 " "  Resp: 16   Temp: 97.9 °F (36.6 °C)   TempSrc: Infrared   SpO2: 97%   Weight: 78.9 kg (174 lb)   Height: 181.6 cm (71.5\")      Physical Exam  Vitals and nursing note reviewed.   Constitutional:       Appearance: Normal appearance. He is normal weight.   HENT:      Head: Normocephalic.   Cardiovascular:      Rate and Rhythm: Normal rate.   Pulmonary:      Effort: Pulmonary effort is normal.   Musculoskeletal:      Cervical back: Normal range of motion and neck supple. No tenderness.   Neurological:      Mental Status: He is alert.   Psychiatric:         Mood and Affect: Mood normal.         Behavior: Behavior normal.         Thought Content: Thought content normal.         Judgment: Judgment normal.        Result Review :                PHQ-9 Depression Screening  Little interest or pleasure in doing things? 0   Feeling down, depressed, or hopeless? 0   Trouble falling or staying asleep, or sleeping too much?     Feeling tired or having little energy?     Poor appetite or overeating?     Feeling bad about yourself - or that you are a failure or have let yourself or your family down?     Trouble concentrating on things, such as reading the newspaper or watching television?     Moving or speaking so slowly that other people could have noticed? Or the opposite - being so fidgety or restless that you have been moving around a lot more than usual?     Thoughts that you would be better off dead, or of hurting yourself in some way?     PHQ-9 Total Score 0   If you checked off any problems, how difficult have these problems made it for you to do your work, take care of things at home, or get along with other people?             Assessment and Plan    Diagnoses and all orders for this visit:    1. Acquired hypothyroidism (Primary)  Comments:  TSH is pending.  Discussed differential diagnosis.  Will call with results.  Follow-up 3 months repeat labs if stabilized    2. Low testosterone in male  Comments:  Continue follow-up " with urology         Problem List Items Addressed This Visit        Active Problems    Acquired hypothyroidism - Primary      Other Visit Diagnoses     Low testosterone in male        Continue follow-up with urology          Follow Up   Return if symptoms worsen or fail to improve.  Patient was given instructions and counseling regarding his condition or for health maintenance advice. Please see specific information pulled into the AVS if appropriate.

## 2021-12-21 LAB
Lab: NORMAL
TSH SERPL DL<=0.005 MIU/L-ACNC: 1.97 UIU/ML (ref 0.45–4.5)
WRITTEN AUTHORIZATION: NORMAL

## 2022-02-01 RX ORDER — LEVOTHYROXINE SODIUM 0.05 MG/1
50 TABLET ORAL DAILY
Qty: 90 TABLET | Refills: 0 | Status: SHIPPED | OUTPATIENT
Start: 2022-02-01 | End: 2022-03-02 | Stop reason: SDUPTHER

## 2022-02-01 NOTE — TELEPHONE ENCOUNTER
Caller: Arnav Styles    Relationship: Self    Best call back number: [phone]     Requested Prescriptions:   Requested Prescriptions     Pending Prescriptions Disp Refills   • levothyroxine (Synthroid) 50 MCG tablet 90 tablet 0     Sig: Take 1 tablet by mouth Daily.        Pharmacy where request should be sent: [id number] - [place]  [address] [facility] [address] AT [facility] OF  [address] [facility] - [phone]  - [phone] FX     Additional details provided by patient: PATIENT ONLY HAS 5 DAYS LEFT. PATIENT HAS SCHEDULED AN APPT.     Does the patient have less than a 3 day supply:  [] Yes  [x] No    Zen Song Rep   02/01/22 10:24 EST

## 2022-02-14 ENCOUNTER — TELEPHONE (OUTPATIENT)
Dept: FAMILY MEDICINE CLINIC | Facility: CLINIC | Age: 32
End: 2022-02-14

## 2022-02-14 DIAGNOSIS — E03.9 ACQUIRED HYPOTHYROIDISM: Primary | ICD-10-CM

## 2022-02-14 NOTE — TELEPHONE ENCOUNTER
Patient is having a hard time sleeping and very restless the last week. He is also having hot/cold flashes and is wanting to know what he needs to do or if there is anything to help with sleep.

## 2022-02-14 NOTE — TELEPHONE ENCOUNTER
PATIENT STATES:THAT HE WOULD LIKE TO TALK TO SOMEONE IN THE OFFICE ABOUT HIS levothyroxine (Synthroid) 50 MCG tablet PLEASE ADVISE      PATIENT CAN BE REACHED ON: [phone]

## 2022-02-14 NOTE — TELEPHONE ENCOUNTER
Tell him I had like for him to go to the lab and get his thyroid level checked to see if that is contributing to sleeplessness.

## 2022-02-15 LAB — TSH SERPL DL<=0.005 MIU/L-ACNC: 1.83 UIU/ML (ref 0.45–4.5)

## 2022-03-02 ENCOUNTER — OFFICE VISIT (OUTPATIENT)
Dept: FAMILY MEDICINE CLINIC | Facility: CLINIC | Age: 32
End: 2022-03-02

## 2022-03-02 VITALS
BODY MASS INDEX: 24.38 KG/M2 | SYSTOLIC BLOOD PRESSURE: 136 MMHG | OXYGEN SATURATION: 99 % | TEMPERATURE: 98 F | WEIGHT: 180 LBS | HEIGHT: 72 IN | DIASTOLIC BLOOD PRESSURE: 74 MMHG | RESPIRATION RATE: 16 BRPM | HEART RATE: 83 BPM

## 2022-03-02 DIAGNOSIS — R79.89 LOW TESTOSTERONE IN MALE: ICD-10-CM

## 2022-03-02 DIAGNOSIS — E03.9 ACQUIRED HYPOTHYROIDISM: Primary | ICD-10-CM

## 2022-03-02 DIAGNOSIS — F51.04 PSYCHOPHYSIOLOGICAL INSOMNIA: ICD-10-CM

## 2022-03-02 PROCEDURE — 99213 OFFICE O/P EST LOW 20 MIN: CPT | Performed by: NURSE PRACTITIONER

## 2022-03-02 RX ORDER — LEVOTHYROXINE SODIUM 0.05 MG/1
50 TABLET ORAL DAILY
Qty: 90 TABLET | Refills: 2 | Status: SHIPPED | OUTPATIENT
Start: 2022-03-02 | End: 2022-05-03

## 2022-03-02 RX ORDER — SYRINGE WITH NEEDLE, 1 ML 25GX5/8"
SYRINGE, EMPTY DISPOSABLE MISCELLANEOUS SEE ADMIN INSTRUCTIONS
COMMUNITY
Start: 2022-02-02

## 2022-03-02 RX ORDER — NEEDLES, DISPOSABLE 25GX5/8"
NEEDLE, DISPOSABLE MISCELLANEOUS SEE ADMIN INSTRUCTIONS
COMMUNITY
Start: 2022-02-02

## 2022-03-02 RX ORDER — TESTOSTERONE CYPIONATE 200 MG/ML
INJECTION, SOLUTION INTRAMUSCULAR
COMMUNITY
Start: 2022-02-02 | End: 2023-01-25 | Stop reason: SDUPTHER

## 2022-03-02 NOTE — PROGRESS NOTES
"Chief Complaint  Thyroid Problem    Subjective          Arnav Styles presents to [facility] for insomnia and thyroid issues    History of Present Illness    Patient is here today reporting he is doing well on his thyroid medicine.  His last TSH was done control on February 14.  He does have some insomnia most nights.  He does respond well to antihistamines for sleep.  However he feels that the BuSpar was not helpful when mixed with a thyroid medicine.  He has discontinued the BuSpar.  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  Reports he just can't shut his mind off when he goes to sleep at night.  Today we discussed thyroid diseases and causes.  Patient also has started testosterone injections for low testosterone level with urology.  He elected to do that because they were less expensive.  Discussed possibilities of that increasing insomnia.      Arnav Styles  has a past medical history of Abnormal Holter monitor finding, Asthma, and Palpitations.      Review of Systems   Constitutional: Negative for fatigue and fever.   Psychiatric/Behavioral: Positive for sleep disturbance. The patient is not nervous/anxious.         Insomnia        Objective       Current Outpatient Medications:   •  ALBUTEROL SULFATE  (90 Base) MCG/ACT inhaler, USE 2 PUFFS EVERY 4 HOURS AS NEEDED FOR SHORTNESS OF AIR, Disp: 18 g, Rfl: 5  •  B-D 3CC LUER-JEROD SYR 22GX1\" 22G X 1\" 3 ML misc, See Admin Instructions., Disp: , Rfl:   •  BD Hypodermic Needle 18G X 1\" misc, See Admin Instructions., Disp: , Rfl:   •  busPIRone (BUSPAR) 5 MG tablet, TAKE 1 TABLET BY MOUTH TWICE DAILY, Disp: 60 tablet, Rfl: 11  •  levothyroxine (Synthroid) 50 MCG tablet, Take 1 tablet by mouth Daily., Disp: 90 tablet, Rfl: 2  •  montelukast (SINGULAIR) 10 MG tablet, TAKE 1 TABLET BY MOUTH EVERY EVENING, Disp: 30 tablet, Rfl: 11  •  predniSONE (DELTASONE) 10 MG tablet, Day 1 and 2 - 3 tabs, Day 3 and 4 - 2 " "tabs, Day 5 and 6 - 1 tabs, Disp: 12 tablet, Rfl: 0  •  SYMBICORT 160-4.5 MCG/ACT inhaler, USE 2 PUFFS TWICE DAILY, Disp: 10.2 g, Rfl: 11  •  Testosterone Cypionate (DEPOTESTOTERONE CYPIONATE) 200 MG/ML injection, INJECT 0.5 MLS INTO THE MUSCLE ONCE WEEKLY, Disp: , Rfl:   •  triamcinolone (KENALOG) 0.025 % ointment, Apply  topically to the appropriate area as directed 2 (Two) Times a Day., Disp: 80 g, Rfl: 1    Vital Signs:      /74 (BP Location: Right arm, Patient Position: Sitting, Cuff Size: Large Adult)   Pulse 83   Temp 98 °F (36.7 °C) (Infrared)   Resp 16   Ht 181.6 cm (71.5\")   Wt 81.6 kg (180 lb)   SpO2 99%   BMI 24.76 kg/m²     Vitals:    03/02/22 0904   BP: 136/74   BP Location: Right arm   Patient Position: Sitting   Cuff Size: Large Adult   Pulse: 83   Resp: 16   Temp: 98 °F (36.7 °C)   TempSrc: Infrared   SpO2: 99%   Weight: 81.6 kg (180 lb)   Height: 181.6 cm (71.5\")      Physical Exam  Vitals reviewed.   Constitutional:       Appearance: Normal appearance.   HENT:      Head: Normocephalic.      Nose: Nose normal.   Eyes:      Conjunctiva/sclera: Conjunctivae normal.   Neck:      Thyroid: No thyroid mass or thyroid tenderness.   Musculoskeletal:      Cervical back: Normal range of motion and neck supple. No tenderness.   Neurological:      Mental Status: He is alert.        Result Review :                PHQ-9 Depression Screening  Little interest or pleasure in doing things? 0   Feeling down, depressed, or hopeless? 0   Trouble falling or staying asleep, or sleeping too much?     Feeling tired or having little energy?     Poor appetite or overeating?     Feeling bad about yourself - or that you are a failure or have let yourself or your family down?     Trouble concentrating on things, such as reading the newspaper or watching television?     Moving or speaking so slowly that other people could have noticed? Or the opposite - being so fidgety or restless that you have been moving around a " lot more than usual?     Thoughts that you would be better off dead, or of hurting yourself in some way?     PHQ-9 Total Score 0   If you checked off any problems, how difficult have these problems made it for you to do your work, take care of things at home, or get along with other people?             Assessment and Plan    Diagnoses and all orders for this visit:    1. Acquired hypothyroidism (Primary)  Comments:  Due to insomnia will do thyroid antibody and ultrasound.  Otherwise follow-up TSH in 3 to 6 months  Orders:  -     Cancel: Thyroid Peroxidase Antibody; Future  -     US Thyroid  -     Thyroid Peroxidase Antibody    2. Psychophysiological insomnia  Comments:  Discussed taking Benadryl or antihistamine at bedtime.    3. Low testosterone in male  Comments:  Continue follow-up with urology    Other orders  -     levothyroxine (Synthroid) 50 MCG tablet; Take 1 tablet by mouth Daily.  Dispense: 90 tablet; Refill: 2         Problem List Items Addressed This Visit        Active Problems    Acquired hypothyroidism - Primary    Relevant Medications    levothyroxine (Synthroid) 50 MCG tablet    Other Relevant Orders    US Thyroid    Thyroid Peroxidase Antibody    Low testosterone in male      Other Visit Diagnoses     Psychophysiological insomnia        Discussed taking Benadryl or antihistamine at bedtime.          Follow Up   Return in about 1 year (around 3/2/2023) for Annual physical.  Patient was given instructions and counseling regarding his condition or for health maintenance advice. Please see specific information pulled into the AVS if appropriate.

## 2022-03-03 LAB — THYROPEROXIDASE AB SERPL-ACNC: 11 IU/ML (ref 0–34)

## 2022-03-08 ENCOUNTER — APPOINTMENT (OUTPATIENT)
Dept: ULTRASOUND IMAGING | Facility: HOSPITAL | Age: 32
End: 2022-03-08

## 2022-05-03 RX ORDER — LEVOTHYROXINE SODIUM 0.05 MG/1
50 TABLET ORAL DAILY
Qty: 90 TABLET | Refills: 2 | Status: SHIPPED | OUTPATIENT
Start: 2022-05-03 | End: 2022-12-28 | Stop reason: SDUPTHER

## 2022-12-14 ENCOUNTER — OFFICE VISIT (OUTPATIENT)
Dept: FAMILY MEDICINE CLINIC | Facility: CLINIC | Age: 32
End: 2022-12-14

## 2022-12-14 VITALS
HEART RATE: 71 BPM | SYSTOLIC BLOOD PRESSURE: 122 MMHG | BODY MASS INDEX: 25.3 KG/M2 | DIASTOLIC BLOOD PRESSURE: 80 MMHG | WEIGHT: 186.8 LBS | HEIGHT: 72 IN | OXYGEN SATURATION: 99 % | RESPIRATION RATE: 18 BRPM | TEMPERATURE: 97.5 F

## 2022-12-14 DIAGNOSIS — E66.3 OVERWEIGHT WITH BODY MASS INDEX (BMI) OF 25 TO 25.9 IN ADULT: ICD-10-CM

## 2022-12-14 DIAGNOSIS — Z20.828 EXPOSURE TO THE FLU: ICD-10-CM

## 2022-12-14 DIAGNOSIS — R50.9 FEVER, UNSPECIFIED FEVER CAUSE: ICD-10-CM

## 2022-12-14 DIAGNOSIS — J06.9 UPPER RESPIRATORY TRACT INFECTION, UNSPECIFIED TYPE: Primary | ICD-10-CM

## 2022-12-14 DIAGNOSIS — R52 BODY ACHES: ICD-10-CM

## 2022-12-14 DIAGNOSIS — J10.1 INFLUENZA A: ICD-10-CM

## 2022-12-14 LAB
EXPIRATION DATE: NORMAL
FLUAV AG UPPER RESP QL IA.RAPID: NOT DETECTED
FLUBV AG UPPER RESP QL IA.RAPID: NOT DETECTED
INTERNAL CONTROL: NORMAL
Lab: NORMAL
SARS-COV-2 AG UPPER RESP QL IA.RAPID: NOT DETECTED

## 2022-12-14 PROCEDURE — 87428 SARSCOV & INF VIR A&B AG IA: CPT | Performed by: FAMILY MEDICINE

## 2022-12-14 PROCEDURE — 99213 OFFICE O/P EST LOW 20 MIN: CPT | Performed by: FAMILY MEDICINE

## 2022-12-14 RX ORDER — AZITHROMYCIN 250 MG/1
TABLET, FILM COATED ORAL
Qty: 6 TABLET | Refills: 0 | Status: SHIPPED | OUTPATIENT
Start: 2022-12-14 | End: 2023-01-25

## 2022-12-14 RX ORDER — OSELTAMIVIR PHOSPHATE 75 MG/1
75 CAPSULE ORAL 2 TIMES DAILY
Qty: 10 CAPSULE | Refills: 0 | Status: SHIPPED | OUTPATIENT
Start: 2022-12-14 | End: 2023-01-25

## 2022-12-14 NOTE — PROGRESS NOTES
Subjective   Arnav Styles is a 32 y.o. male.     Chief Complaint   Patient presents with   • URI   • Fever   • Generalized Body Aches       History of Present Illness  Arnav's son tested positive for Flu A this morning.  URI   This is a new problem. The current episode started in the past 7 days. The maximum temperature recorded prior to his arrival was 100.4 - 100.9 F. Associated symptoms include congestion, coughing, rhinorrhea and a sore throat. Pertinent negatives include no abdominal pain, chest pain or nausea. Associated symptoms comments: Legs have been achy.   Fever   This is a new problem. The current episode started in the past 7 days. The maximum temperature noted was 100 to 100.9 F. Associated symptoms include congestion, coughing and a sore throat. Pertinent negatives include no abdominal pain, chest pain or nausea.            I personally reviewed and updated the patient's allergies, medications, problem list, and past medical, surgical, social, and family history. I have reviewed and confirmed the accuracy of the History of Present Illness and Review of Symptoms as documented by the MA/ARRONN/RN. Juan Smith MD    Family History   Problem Relation Age of Onset   • Throat cancer Father        Social History     Tobacco Use   • Smoking status: Never   • Smokeless tobacco: Former   Vaping Use   • Vaping Use: Never used   Substance Use Topics   • Alcohol use: No   • Drug use: No       Past Surgical History:   Procedure Laterality Date   • TONSILLECTOMY         Patient Active Problem List   Diagnosis   • Asthma   • Palpitations   • Seasonal allergies   • Exertional shortness of breath   • Oral lesion   • Other fatigue   • Grief   • Anxiety   • Acquired hypothyroidism   • Low testosterone in male   • Overweight with body mass index (BMI) of 25 to 25.9 in adult   • Influenza A         Current Outpatient Medications:   •  levothyroxine (SYNTHROID, LEVOTHROID) 50 MCG tablet, TAKE 1 TABLET BY MOUTH DAILY,  "Disp: 90 tablet, Rfl: 2  •  ALBUTEROL SULFATE  (90 Base) MCG/ACT inhaler, USE 2 PUFFS EVERY 4 HOURS AS NEEDED FOR SHORTNESS OF AIR, Disp: 18 g, Rfl: 5  •  azithromycin (ZITHROMAX) 250 MG tablet, Take 2 tablets the first day, then 1 tablet daily for 4 days., Disp: 6 tablet, Rfl: 0  •  B-D 3CC LUER-JEROD SYR 22GX1\" 22G X 1\" 3 ML misc, See Admin Instructions., Disp: , Rfl:   •  BD Hypodermic Needle 18G X 1\" misc, See Admin Instructions., Disp: , Rfl:   •  busPIRone (BUSPAR) 5 MG tablet, TAKE 1 TABLET BY MOUTH TWICE DAILY, Disp: 60 tablet, Rfl: 11  •  montelukast (SINGULAIR) 10 MG tablet, TAKE 1 TABLET BY MOUTH EVERY EVENING, Disp: 30 tablet, Rfl: 11  •  oseltamivir (TAMIFLU) 75 MG capsule, Take 1 capsule by mouth 2 (Two) Times a Day., Disp: 10 capsule, Rfl: 0  •  SYMBICORT 160-4.5 MCG/ACT inhaler, USE 2 PUFFS TWICE DAILY, Disp: 10.2 g, Rfl: 11  •  Testosterone Cypionate (DEPOTESTOTERONE CYPIONATE) 200 MG/ML injection, INJECT 0.5 MLS INTO THE MUSCLE ONCE WEEKLY, Disp: , Rfl:   •  Testosterone Cypionate (DEPOTESTOTERONE CYPIONATE) 200 MG/ML injection, Inject 0.5 ml into the appropriate muscle as directed weekly, Disp: 4 mL, Rfl: 2  •  triamcinolone (KENALOG) 0.025 % ointment, Apply  topically to the appropriate area as directed 2 (Two) Times a Day., Disp: 80 g, Rfl: 1         Review of Systems   Constitutional: Positive for fever. Negative for chills and diaphoresis.   HENT: Positive for congestion, rhinorrhea and sore throat.    Eyes: Negative for visual disturbance.   Respiratory: Positive for cough. Negative for shortness of breath.    Cardiovascular: Negative for chest pain and palpitations.   Gastrointestinal: Negative for abdominal pain and nausea.   Endocrine: Negative for polydipsia and polyphagia.   Musculoskeletal: Negative for neck stiffness.   Skin: Negative for color change and pallor.   Neurological: Negative for seizures and syncope.   Hematological: Negative for adenopathy.   Psychiatric/Behavioral: " "Negative for hallucinations and suicidal ideas.       I have reviewed and confirmed the accuracy of the ROS as documented by the MA/LPN/RN Juan Smith MD      Objective   /80 (BP Location: Left arm, Patient Position: Sitting, Cuff Size: Adult)   Pulse 71   Temp 97.5 °F (36.4 °C)   Resp 18   Ht 181.6 cm (71.5\")   Wt 84.7 kg (186 lb 12.8 oz)   SpO2 99%   BMI 25.69 kg/m²   BP Readings from Last 3 Encounters:   12/14/22 122/80   03/02/22 136/74   12/20/21 131/79     Wt Readings from Last 3 Encounters:   12/14/22 84.7 kg (186 lb 12.8 oz)   03/02/22 81.6 kg (180 lb)   12/20/21 78.9 kg (174 lb)     Physical Exam  Constitutional:       Appearance: Normal appearance. He is well-developed. He is not diaphoretic.   HENT:      Head: Normocephalic.      Right Ear: Hearing, ear canal and external ear normal. A middle ear effusion is present. Tympanic membrane is erythematous.      Left Ear: Hearing, ear canal and external ear normal. A middle ear effusion is present. Tympanic membrane is erythematous.      Nose: Congestion present.      Right Sinus: Maxillary sinus tenderness and frontal sinus tenderness present.      Left Sinus: Maxillary sinus tenderness and frontal sinus tenderness present.      Mouth/Throat:      Pharynx: Posterior oropharyngeal erythema present.      Tonsils: No tonsillar abscesses. 1+ on the right. 1+ on the left.   Eyes:      General: Lids are normal.      Conjunctiva/sclera: Conjunctivae normal.      Pupils: Pupils are equal, round, and reactive to light.   Neck:      Meningeal: Brudzinski's sign and Kernig's sign absent.   Cardiovascular:      Rate and Rhythm: Normal rate and regular rhythm.      Pulses: Normal pulses.      Heart sounds: Normal heart sounds, S1 normal and S2 normal. No murmur heard.    No friction rub. No gallop.   Pulmonary:      Effort: Pulmonary effort is normal. No accessory muscle usage or respiratory distress.      Breath sounds: No stridor. Examination of the " right-upper field reveals wheezing and rhonchi. Examination of the left-upper field reveals wheezing and rhonchi. Examination of the right-middle field reveals wheezing and rhonchi. Examination of the left-middle field reveals wheezing and rhonchi. Examination of the right-lower field reveals wheezing and rhonchi. Examination of the left-lower field reveals wheezing and rhonchi. Wheezing and rhonchi present. No decreased breath sounds or rales.   Abdominal:      General: Bowel sounds are normal. There is no distension.      Palpations: Abdomen is soft. There is no mass.      Tenderness: There is no abdominal tenderness.      Hernia: No hernia is present.   Skin:     General: Skin is warm and dry.      Coloration: Skin is not pale.   Neurological:      Mental Status: He is alert and oriented to person, place, and time.      Cranial Nerves: No cranial nerve deficit.      Coordination: Coordination normal.      Gait: Gait normal.         Data / Lab Results:    No results found for: HGBA1C     No results found for: LDL, LDLDIRECT  No results found for: CHOL  No results found for: TRIG  No results found for: HDL  No results found for: PSA  Lab Results   Component Value Date    WBC 5.6 11/03/2021    HGB 16.1 11/03/2021    HCT 46.0 11/03/2021    MCV 88 11/03/2021     11/03/2021     Lab Results   Component Value Date    TSH 1.830 02/14/2022    U8EULTV 109 12/17/2021    THYROIDAB 11 03/02/2022      Lab Results   Component Value Date    GLUCOSE 71 11/03/2021    BUN 15 11/03/2021    CREATININE 1.21 11/03/2021    EGFRIFNONA 79 11/03/2021    EGFRIFAFRI 92 11/03/2021    BCR 12 11/03/2021    K 4.2 11/03/2021    CO2 24 11/03/2021    CALCIUM 9.6 11/03/2021    PROTENTOTREF 7.0 11/03/2021    ALBUMIN 4.8 11/03/2021    LABIL2 2.2 11/03/2021    AST 27 11/03/2021    ALT 58 (H) 11/03/2021     No results found for: SHARI, RF, SEDRATE   No results found for: CRP   No results found for: IRON, TIBC, FERRITIN   No results found for:  TYCBVLZL80       Assessment & Plan      Medications        Problem List         LOS    Influenza A.  Swab negative today secondary to early in course.  Start Tamiflu.  Increase fluid intake.  Signs symptoms of dehydration discussed call return if worsening symptoms.  Acute bacterial sinusitis.  Start antibiotics.  Increase fluid intake.      Diagnoses and all orders for this visit:    1. Upper respiratory tract infection, unspecified type (Primary)  -     POCT SARS-CoV-2 Antigen ELIZABETH  -     azithromycin (ZITHROMAX) 250 MG tablet; Take 2 tablets the first day, then 1 tablet daily for 4 days.  Dispense: 6 tablet; Refill: 0  -     oseltamivir (TAMIFLU) 75 MG capsule; Take 1 capsule by mouth 2 (Two) Times a Day.  Dispense: 10 capsule; Refill: 0    2. Body aches  -     POCT SARS-CoV-2 Antigen ELIZABETH  -     azithromycin (ZITHROMAX) 250 MG tablet; Take 2 tablets the first day, then 1 tablet daily for 4 days.  Dispense: 6 tablet; Refill: 0  -     oseltamivir (TAMIFLU) 75 MG capsule; Take 1 capsule by mouth 2 (Two) Times a Day.  Dispense: 10 capsule; Refill: 0    3. Fever, unspecified fever cause  -     POCT SARS-CoV-2 Antigen ELIZABETH  -     azithromycin (ZITHROMAX) 250 MG tablet; Take 2 tablets the first day, then 1 tablet daily for 4 days.  Dispense: 6 tablet; Refill: 0  -     oseltamivir (TAMIFLU) 75 MG capsule; Take 1 capsule by mouth 2 (Two) Times a Day.  Dispense: 10 capsule; Refill: 0    4. Overweight with body mass index (BMI) of 25 to 25.9 in adult  Assessment & Plan:  Patient's (Body mass index is 25.69 kg/m².) indicates that they are overweight with health conditions that include none . Weight is unchanged. BMI is is above average; BMI management plan is completed. We discussed portion control and increasing exercise.       5. Exposure to the flu  -     azithromycin (ZITHROMAX) 250 MG tablet; Take 2 tablets the first day, then 1 tablet daily for 4 days.  Dispense: 6 tablet; Refill: 0  -     oseltamivir (TAMIFLU) 75 MG  capsule; Take 1 capsule by mouth 2 (Two) Times a Day.  Dispense: 10 capsule; Refill: 0    6. Influenza A            Expected course, medications, and adverse effects discussed.  Call or return if worsening or persistent symptoms.  I wore protective equipment throughout this patient encounter including a mask, gloves, and eye protection.  Hand hygiene was performed before donning protective equipment and after removal when leaving the room. The complete contents of the Assessment and Plan and Data/Lab Results as documented above have been reviewed and addressed by myself with the patient today as part of an ongoing evaluation / treatment plan.  If some of the documentation has been copied from a previous note and is unchanged it indicates that this problem / plan has been assessed today but is stable from a previous visit and no changes have been recommended.

## 2022-12-14 NOTE — ASSESSMENT & PLAN NOTE
Patient's (Body mass index is 25.69 kg/m².) indicates that they are overweight with health conditions that include none . Weight is unchanged. BMI is is above average; BMI management plan is completed. We discussed portion control and increasing exercise.

## 2022-12-28 RX ORDER — LEVOTHYROXINE SODIUM 0.05 MG/1
50 TABLET ORAL DAILY
Qty: 90 TABLET | Refills: 0 | Status: SHIPPED | OUTPATIENT
Start: 2022-12-28 | End: 2023-03-31

## 2022-12-28 NOTE — TELEPHONE ENCOUNTER
Caller: Arnav Styles    Relationship: Self    Best call back number: [phone]    Requested Prescriptions:   Requested Prescriptions     Pending Prescriptions Disp Refills   • levothyroxine (SYNTHROID, LEVOTHROID) 50 MCG tablet 90 tablet 2     Sig: Take 1 tablet by mouth Daily.        Pharmacy where request should be sent: [facility] [address] [place] [address] [facility]  - [phone]  - [phone] FX     Additional details provided by patient:     Does the patient have less than a 3 day supply:  [x] Yes  [] No    Would you like a call back once the refill request has been completed: [x] Yes [] No    If the office needs to give you a call back, can they leave a voicemail: [x] Yes [] No    Zen Quiñones Rep   12/28/22 09:09 EST

## 2022-12-29 RX ORDER — LEVOTHYROXINE SODIUM 0.05 MG/1
50 TABLET ORAL DAILY
Qty: 90 TABLET | Refills: 0 | OUTPATIENT
Start: 2022-12-29

## 2023-01-16 ENCOUNTER — TELEPHONE (OUTPATIENT)
Dept: FAMILY MEDICINE CLINIC | Facility: CLINIC | Age: 33
End: 2023-01-16

## 2023-01-16 DIAGNOSIS — Z00.00 PHYSICAL EXAM, ANNUAL: ICD-10-CM

## 2023-01-16 DIAGNOSIS — E03.9 ACQUIRED HYPOTHYROIDISM: Primary | ICD-10-CM

## 2023-01-16 NOTE — TELEPHONE ENCOUNTER
Caller: Arnav Styles    Relationship: Self    Best call back number: [phone]    What orders are you requesting (i.e. lab or imaging): LABS    In what timeframe would the patient need to come in: PRIOR TO VISIT ON 01/25/2023     Where will you receive your lab/imaging services: ACROSS THE OFFICE    Additional notes: PATIENT STATES HE IS WANTING TO KNOW IF HIS LEVELS SHOULD BE CHECKED BEFORE HIS APPOINTMENT ON 01/25/2023 DUE TO HIM THINKING HE MAY NEED HIS LEVOTHYROXINE MEDICATION UPPED.    PATIENT STATES HE IS REQUESTING A CALL BACK TO LET HIM KNOW.

## 2023-01-16 NOTE — TELEPHONE ENCOUNTER
Please inform patient that I ordered his labs for next week.  I went ahead and included cholesterol and his physical labs, as he has not had them for a while.  If he agrees with that we can change his visit type to a physical

## 2023-01-19 LAB
ALBUMIN SERPL-MCNC: 4.4 G/DL (ref 4–5)
ALBUMIN/GLOB SERPL: 2 {RATIO} (ref 1.2–2.2)
ALP SERPL-CCNC: 100 IU/L (ref 44–121)
ALT SERPL-CCNC: 20 IU/L (ref 0–44)
AST SERPL-CCNC: 18 IU/L (ref 0–40)
BASOPHILS # BLD AUTO: 0.1 X10E3/UL (ref 0–0.2)
BASOPHILS NFR BLD AUTO: 1 %
BILIRUB SERPL-MCNC: 1.2 MG/DL (ref 0–1.2)
BUN SERPL-MCNC: 13 MG/DL (ref 6–20)
BUN/CREAT SERPL: 9 (ref 9–20)
CALCIUM SERPL-MCNC: 9 MG/DL (ref 8.7–10.2)
CHLORIDE SERPL-SCNC: 102 MMOL/L (ref 96–106)
CHOLEST SERPL-MCNC: 212 MG/DL (ref 100–199)
CHOLEST/HDLC SERPL: 5.3 RATIO (ref 0–5)
CO2 SERPL-SCNC: 25 MMOL/L (ref 20–29)
CREAT SERPL-MCNC: 1.42 MG/DL (ref 0.76–1.27)
EGFRCR SERPLBLD CKD-EPI 2021: 67 ML/MIN/1.73
EOSINOPHIL # BLD AUTO: 0.3 X10E3/UL (ref 0–0.4)
EOSINOPHIL NFR BLD AUTO: 5 %
ERYTHROCYTE [DISTWIDTH] IN BLOOD BY AUTOMATED COUNT: 14.2 % (ref 11.6–15.4)
GLOBULIN SER CALC-MCNC: 2.2 G/DL (ref 1.5–4.5)
GLUCOSE SERPL-MCNC: 94 MG/DL (ref 70–99)
HCT VFR BLD AUTO: 51.8 % (ref 37.5–51)
HDLC SERPL-MCNC: 40 MG/DL
HGB BLD-MCNC: 17 G/DL (ref 13–17.7)
IMM GRANULOCYTES # BLD AUTO: 0 X10E3/UL (ref 0–0.1)
IMM GRANULOCYTES NFR BLD AUTO: 0 %
LDLC SERPL CALC-MCNC: 156 MG/DL (ref 0–99)
LYMPHOCYTES # BLD AUTO: 2.3 X10E3/UL (ref 0.7–3.1)
LYMPHOCYTES NFR BLD AUTO: 37 %
MCH RBC QN AUTO: 27.3 PG (ref 26.6–33)
MCHC RBC AUTO-ENTMCNC: 32.8 G/DL (ref 31.5–35.7)
MCV RBC AUTO: 83 FL (ref 79–97)
MONOCYTES # BLD AUTO: 0.5 X10E3/UL (ref 0.1–0.9)
MONOCYTES NFR BLD AUTO: 7 %
NEUTROPHILS # BLD AUTO: 3.2 X10E3/UL (ref 1.4–7)
NEUTROPHILS NFR BLD AUTO: 50 %
PLATELET # BLD AUTO: 199 X10E3/UL (ref 150–450)
POTASSIUM SERPL-SCNC: 5.1 MMOL/L (ref 3.5–5.2)
PROT SERPL-MCNC: 6.6 G/DL (ref 6–8.5)
RBC # BLD AUTO: 6.23 X10E6/UL (ref 4.14–5.8)
SODIUM SERPL-SCNC: 140 MMOL/L (ref 134–144)
T3 SERPL-MCNC: 119 NG/DL (ref 71–180)
T4 FREE SERPL-MCNC: 1.21 NG/DL (ref 0.82–1.77)
TRIGL SERPL-MCNC: 90 MG/DL (ref 0–149)
TSH SERPL DL<=0.005 MIU/L-ACNC: 2.94 UIU/ML (ref 0.45–4.5)
VLDLC SERPL CALC-MCNC: 16 MG/DL (ref 5–40)
WBC # BLD AUTO: 6.3 X10E3/UL (ref 3.4–10.8)

## 2023-01-25 ENCOUNTER — OFFICE VISIT (OUTPATIENT)
Dept: FAMILY MEDICINE CLINIC | Facility: CLINIC | Age: 33
End: 2023-01-25
Payer: COMMERCIAL

## 2023-01-25 VITALS
TEMPERATURE: 97.6 F | OXYGEN SATURATION: 98 % | RESPIRATION RATE: 16 BRPM | BODY MASS INDEX: 24.92 KG/M2 | SYSTOLIC BLOOD PRESSURE: 134 MMHG | DIASTOLIC BLOOD PRESSURE: 85 MMHG | HEIGHT: 72 IN | WEIGHT: 184 LBS | HEART RATE: 79 BPM

## 2023-01-25 DIAGNOSIS — K60.2 RECTAL FISSURE: ICD-10-CM

## 2023-01-25 DIAGNOSIS — E78.2 MIXED HYPERLIPIDEMIA: ICD-10-CM

## 2023-01-25 DIAGNOSIS — Z00.00 ANNUAL PHYSICAL EXAM: Primary | ICD-10-CM

## 2023-01-25 DIAGNOSIS — R79.89 LOW TESTOSTERONE IN MALE: ICD-10-CM

## 2023-01-25 DIAGNOSIS — E03.9 ACQUIRED HYPOTHYROIDISM: ICD-10-CM

## 2023-01-25 DIAGNOSIS — K59.09 OTHER CONSTIPATION: ICD-10-CM

## 2023-01-25 DIAGNOSIS — E66.3 OVERWEIGHT WITH BODY MASS INDEX (BMI) OF 25 TO 25.9 IN ADULT: ICD-10-CM

## 2023-01-25 PROCEDURE — 99395 PREV VISIT EST AGE 18-39: CPT | Performed by: NURSE PRACTITIONER

## 2023-01-25 NOTE — PROGRESS NOTES
Chief Complaint  Hypothyroidism    Subjective          Arnav Styles presents to [facility] for physical exam in hypothyroidism    History of Present Illness    Patient is here for a physical and to follow-up on hypothyroidism.  He had labs drawn prior to this visit.  He has an elevation in RBC and hematocrit.  He is currently taking testosterone injections for low T.  He has been told by urology to donate blood twice a year to offset elevation.  He does not have appointment there for 4 months and it has been a while since he donated blood.  We discussed this at length today.    Patient has constipation and was concerned that he had a change in his thyroid.  He feels much better being on thyroid medicine.  His levels are normal today.  He has been eating differently recently.  We also reviewed his cholesterol with an elevated LDL.  We discussed diet.  Patient exercises about 7 days a week.  Otherwise he is feeling well.  He did have 1 episode of bleeding when he wiped.  His wife is a nurse and had thought by looking at the area that he probably had a fissure.  He does not have that any longer or any other signs.  He did try MiraLAX for constipation for a few days and it did work well for him.  He took a Dulcolax and had a lot of cramping.  He has no other symptoms and feels that this is strongly fully related.    Arnav Styles  has a past medical history of Abnormal Holter monitor finding, Asthma, and Palpitations.      Review of Systems   Constitutional: Negative for fatigue.   HENT: Negative for ear pain, sinus pain and sore throat.    Eyes: Negative for pain.   Respiratory: Negative for cough and shortness of breath.    Cardiovascular: Negative for chest pain.   Gastrointestinal: Positive for constipation. Negative for abdominal pain, diarrhea and nausea.   Endocrine: Negative for polyuria.   Genitourinary: Negative for decreased urine volume and dysuria.   Musculoskeletal:  "Negative for arthralgias.   Skin: Negative for rash.   Allergic/Immunologic: Negative for environmental allergies.   Neurological: Negative for dizziness, numbness and headaches.   Hematological: Does not bruise/bleed easily.   Psychiatric/Behavioral: The patient is not nervous/anxious.         Objective       Current Outpatient Medications:   •  B-D 3CC LUER-JEROD SYR 22GX1\" 22G X 1\" 3 ML misc, See Admin Instructions., Disp: , Rfl:   •  BD Hypodermic Needle 18G X 1\" misc, See Admin Instructions., Disp: , Rfl:   •  levothyroxine (SYNTHROID, LEVOTHROID) 50 MCG tablet, Take 1 tablet by mouth Daily., Disp: 90 tablet, Rfl: 0  •  Testosterone Cypionate (DEPOTESTOTERONE CYPIONATE) 200 MG/ML injection, Inject 0.5 ml into the appropriate muscle as directed weekly, Disp: 4 mL, Rfl: 2    Vital Signs:      /85 (BP Location: Left arm, Patient Position: Sitting, Cuff Size: Small Adult)   Pulse 79   Temp 97.6 °F (36.4 °C) (Infrared)   Resp 16   Ht 181.6 cm (71.5\")   Wt 83.5 kg (184 lb)   SpO2 98%   BMI 25.31 kg/m²     Vitals:    01/25/23 1244   BP: 134/85   BP Location: Left arm   Patient Position: Sitting   Cuff Size: Small Adult   Pulse: 79   Resp: 16   Temp: 97.6 °F (36.4 °C)   TempSrc: Infrared   SpO2: 98%   Weight: 83.5 kg (184 lb)   Height: 181.6 cm (71.5\")      Physical Exam  Vitals and nursing note reviewed.   Constitutional:       Appearance: Normal appearance. He is well-developed.   HENT:      Head: Normocephalic.      Right Ear: Tympanic membrane, ear canal and external ear normal.      Left Ear: Tympanic membrane, ear canal and external ear normal.      Nose: Nose normal.      Mouth/Throat:      Lips: Pink.      Mouth: Mucous membranes are moist.      Pharynx: Oropharynx is clear. Uvula midline.   Eyes:      General: Lids are normal. Vision grossly intact.      Conjunctiva/sclera: Conjunctivae normal.      Pupils: Pupils are equal, round, and reactive to light.   Neck:      Thyroid: No thyroid mass or " thyromegaly.   Cardiovascular:      Rate and Rhythm: Regular rhythm.      Heart sounds: S1 normal and S2 normal. No murmur heard.    No friction rub. No gallop.   Pulmonary:      Effort: No respiratory distress.      Breath sounds: Normal breath sounds. No decreased breath sounds, wheezing or rales.   Chest:      Chest wall: No mass or tenderness.   Abdominal:      General: Bowel sounds are normal. There is no distension.      Palpations: Abdomen is soft.      Tenderness: There is no abdominal tenderness.      Hernia: No hernia is present.   Musculoskeletal:         General: Normal range of motion.      Cervical back: Normal range of motion and neck supple.   Lymphadenopathy:      Cervical: No cervical adenopathy.   Skin:     General: Skin is warm and dry.   Neurological:      General: No focal deficit present.      Mental Status: He is alert and oriented to person, place, and time.      Sensory: No sensory deficit.      Motor: Motor function is intact.      Coordination: Coordination is intact.      Gait: Gait is intact.      Deep Tendon Reflexes: Reflexes are normal and symmetric.   Psychiatric:         Mood and Affect: Mood normal. Mood is not anxious or depressed.         Speech: Speech normal.         Behavior: Behavior normal.         Thought Content: Thought content normal.         Judgment: Judgment normal.        Result Review :                  PHQ-9 Total Score: 0           Assessment and Plan    Diagnoses and all orders for this visit:    1. Annual physical exam (Primary)  Comments:  Anticipatory guidance was done discussed wellness    2. Low testosterone in male  Assessment & Plan:  Continue follow-up with the urology.  Discussed elevated RBCs and hematic      3. Acquired hypothyroidism  Assessment & Plan:  Discussed labs in detail.  Offered endocrinology if he wants to see them.  Declined for now      4. Overweight with body mass index (BMI) of 25 to 25.9 in adult    5. Mixed hyperlipidemia  Assessment  & Plan:  Lipid abnormalities are newly identified.  Nutritional counseling was provided.  Lipids will be reassessed in 3 months.      6. Other constipation  Assessment & Plan:  MiraLAX as directed.  If not improving to call      7. Rectal fissure  Assessment & Plan:  Patient report has resolved.         Problem List Items Addressed This Visit        Active Problems    Acquired hypothyroidism    Current Assessment & Plan     Discussed labs in detail.  Offered endocrinology if he wants to see them.  Declined for now         Low testosterone in male    Current Assessment & Plan     Continue follow-up with the urology.  Discussed elevated RBCs and hematic         Overweight with body mass index (BMI) of 25 to 25.9 in adult    Mixed hyperlipidemia    Current Assessment & Plan     Lipid abnormalities are newly identified.  Nutritional counseling was provided.  Lipids will be reassessed in 3 months.         Other constipation    Current Assessment & Plan     MiraLAX as directed.  If not improving to call         Rectal fissure    Current Assessment & Plan     Patient report has resolved.        Other Visit Diagnoses     Annual physical exam    -  Primary    Anticipatory guidance was done discussed wellness          Follow Up   No follow-ups on file.  Patient was given instructions and counseling regarding his condition or for health maintenance advice. Please see specific information pulled into the AVS if appropriate.

## 2023-03-31 RX ORDER — LEVOTHYROXINE SODIUM 0.05 MG/1
TABLET ORAL
Qty: 90 TABLET | Refills: 0 | Status: SHIPPED | OUTPATIENT
Start: 2023-03-31

## 2023-05-24 DIAGNOSIS — E78.2 MIXED HYPERLIPIDEMIA: Primary | ICD-10-CM

## 2023-09-18 RX ORDER — LEVOTHYROXINE SODIUM 0.05 MG/1
TABLET ORAL
Qty: 90 TABLET | Refills: 0 | Status: SHIPPED | OUTPATIENT
Start: 2023-09-18

## 2023-10-17 DIAGNOSIS — E03.9 ACQUIRED HYPOTHYROIDISM: Primary | ICD-10-CM

## 2023-12-18 RX ORDER — LEVOTHYROXINE SODIUM 0.05 MG/1
TABLET ORAL
Qty: 90 TABLET | Refills: 0 | Status: SHIPPED | OUTPATIENT
Start: 2023-12-18

## 2024-01-30 ENCOUNTER — TELEPHONE (OUTPATIENT)
Dept: FAMILY MEDICINE CLINIC | Facility: CLINIC | Age: 34
End: 2024-01-30
Payer: COMMERCIAL

## 2024-01-30 DIAGNOSIS — E03.9 ACQUIRED HYPOTHYROIDISM: ICD-10-CM

## 2024-01-30 DIAGNOSIS — E78.2 MIXED HYPERLIPIDEMIA: Primary | ICD-10-CM

## 2024-03-20 LAB
CHOLEST SERPL-MCNC: 229 MG/DL (ref 100–199)
CHOLEST/HDLC SERPL: 5.7 RATIO (ref 0–5)
HDLC SERPL-MCNC: 40 MG/DL
LDLC SERPL CALC-MCNC: 174 MG/DL (ref 0–99)
TRIGL SERPL-MCNC: 83 MG/DL (ref 0–149)
TSH SERPL DL<=0.005 MIU/L-ACNC: 2.5 UIU/ML (ref 0.45–4.5)
VLDLC SERPL CALC-MCNC: 15 MG/DL (ref 5–40)

## 2024-03-20 RX ORDER — LEVOTHYROXINE SODIUM 0.05 MG/1
TABLET ORAL
Qty: 30 TABLET | Refills: 0 | Status: SHIPPED | OUTPATIENT
Start: 2024-03-20

## 2024-04-15 RX ORDER — LEVOTHYROXINE SODIUM 0.05 MG/1
TABLET ORAL
Qty: 30 TABLET | Refills: 0 | Status: SHIPPED | OUTPATIENT
Start: 2024-04-15

## 2024-04-23 ENCOUNTER — OFFICE VISIT (OUTPATIENT)
Dept: FAMILY MEDICINE CLINIC | Facility: CLINIC | Age: 34
End: 2024-04-23
Payer: COMMERCIAL

## 2024-04-23 VITALS
WEIGHT: 184 LBS | DIASTOLIC BLOOD PRESSURE: 84 MMHG | RESPIRATION RATE: 14 BRPM | OXYGEN SATURATION: 97 % | HEART RATE: 65 BPM | HEIGHT: 71 IN | BODY MASS INDEX: 25.76 KG/M2 | SYSTOLIC BLOOD PRESSURE: 139 MMHG | TEMPERATURE: 97.9 F

## 2024-04-23 DIAGNOSIS — E03.9 ACQUIRED HYPOTHYROIDISM: ICD-10-CM

## 2024-04-23 DIAGNOSIS — R79.89 LOW TESTOSTERONE IN MALE: ICD-10-CM

## 2024-04-23 DIAGNOSIS — E66.3 OVERWEIGHT WITH BODY MASS INDEX (BMI) OF 25 TO 25.9 IN ADULT: ICD-10-CM

## 2024-04-23 DIAGNOSIS — J30.2 SEASONAL ALLERGIES: ICD-10-CM

## 2024-04-23 DIAGNOSIS — E78.2 MIXED HYPERLIPIDEMIA: ICD-10-CM

## 2024-04-23 DIAGNOSIS — Z00.00 ANNUAL PHYSICAL EXAM: Primary | ICD-10-CM

## 2024-04-23 PROBLEM — K13.70 ORAL LESION: Status: RESOLVED | Noted: 2019-08-14 | Resolved: 2024-04-23

## 2024-04-23 PROBLEM — R53.83 OTHER FATIGUE: Status: RESOLVED | Noted: 2019-11-13 | Resolved: 2024-04-23

## 2024-04-23 PROBLEM — R06.02 EXERTIONAL SHORTNESS OF BREATH: Status: RESOLVED | Noted: 2018-11-12 | Resolved: 2024-04-23

## 2024-04-23 PROBLEM — F41.9 ANXIETY: Status: RESOLVED | Noted: 2019-11-13 | Resolved: 2024-04-23

## 2024-04-23 PROCEDURE — 99395 PREV VISIT EST AGE 18-39: CPT | Performed by: NURSE PRACTITIONER

## 2024-04-23 NOTE — ASSESSMENT & PLAN NOTE
Lipid abnormalities are worsening    Plan:  Discussed diet.  Healthy diet website given.  May also be related to testosterone..      Discussed medication dosage, use, side effects, and goals of treatment in detail.    Counseled patient on lifestyle modifications to help control hyperlipidemia.     Patient Treatment Goals:     LDL goal is under 100    Followup in 3 months.

## 2024-04-23 NOTE — PROGRESS NOTES
"Chief Complaint  Annual Exam and Hypothyroidism    Subjective          Arnav Styles presents to [facility] for annual exam, hypothyroidism and hyperlipidemia    History of Present Illness    Patient is here for annual exam.  He is on thyroid medicine and doing well.  He also is on injectable testosterone, for low testosterone levels.    Patient has had increase in his cholesterol recently.  We went over those in detail today    Hyperlipidemia: Please inform pt the bad cholesterol has elevated to 174.  His good cholesterol is 40.  The ratio has gone up to 5.7.  That means altogether his bad cholesterol is dominating his cholesterol panel.  His thyroid is in good control.       He uses half-and-half for his coffee.  Otherwise eats well and does exercise every day.  Has a hereditary tendency to have hyperlipidemia as his sister also has similar incidents in her labs        Arnav Styles  has a past medical history of Abnormal Holter monitor finding, Asthma, and Palpitations.      Review of Systems   Constitutional:  Negative for fatigue.   HENT:  Negative for ear pain, sinus pain and sore throat.    Eyes:  Negative for pain.   Respiratory:  Negative for cough and shortness of breath.    Cardiovascular:  Negative for chest pain.   Gastrointestinal:  Negative for abdominal pain, diarrhea and nausea.   Endocrine: Negative for polyuria.   Genitourinary:  Negative for decreased urine volume and dysuria.   Musculoskeletal:  Negative for arthralgias.   Skin:  Negative for rash.   Allergic/Immunologic: Negative for environmental allergies.   Neurological:  Negative for dizziness, numbness and headaches.   Hematological:  Does not bruise/bleed easily.   Psychiatric/Behavioral:  The patient is not nervous/anxious.         Objective       Current Outpatient Medications:     B-D 3CC LUER-JEROD SYR 22GX1\" 22G X 1\" 3 ML misc, See Admin Instructions., Disp: , Rfl:     BD Hypodermic Needle 18G X 1\" " "misc, See Admin Instructions., Disp: , Rfl:     levothyroxine (SYNTHROID, LEVOTHROID) 50 MCG tablet, Take 1 tablet by mouth once daily, Disp: 30 tablet, Rfl: 0    Testosterone Cypionate (DEPOTESTOTERONE CYPIONATE) 200 MG/ML injection, Inject 0.5 ml into the appropriate muscle as directed weekly, Disp: 4 mL, Rfl: 2    Vital Signs:      /84 (BP Location: Right arm, Patient Position: Sitting, Cuff Size: Small Adult)   Pulse 65   Temp 97.9 °F (36.6 °C) (Infrared)   Resp 14   Ht 180.3 cm (71\")   Wt 83.5 kg (184 lb)   SpO2 97%   BMI 25.66 kg/m²     Vitals:    04/23/24 1516   BP: 139/84   BP Location: Right arm   Patient Position: Sitting   Cuff Size: Small Adult   Pulse: 65   Resp: 14   Temp: 97.9 °F (36.6 °C)   TempSrc: Infrared   SpO2: 97%   Weight: 83.5 kg (184 lb)   Height: 180.3 cm (71\")      Physical Exam  Vitals and nursing note reviewed.   Constitutional:       Appearance: Normal appearance. He is well-developed.   HENT:      Head: Normocephalic.      Right Ear: Tympanic membrane, ear canal and external ear normal.      Left Ear: Tympanic membrane, ear canal and external ear normal.      Nose: Nose normal.      Mouth/Throat:      Lips: Pink.      Mouth: Mucous membranes are moist.      Pharynx: Oropharynx is clear. Uvula midline.   Eyes:      General: Lids are normal. Vision grossly intact.      Conjunctiva/sclera: Conjunctivae normal.      Pupils: Pupils are equal, round, and reactive to light.   Neck:      Thyroid: No thyroid mass or thyromegaly.   Cardiovascular:      Rate and Rhythm: Regular rhythm.      Heart sounds: S1 normal and S2 normal. No murmur heard.     No friction rub. No gallop.   Pulmonary:      Effort: No respiratory distress.      Breath sounds: Normal breath sounds. No decreased breath sounds, wheezing or rales.   Chest:      Chest wall: No mass or tenderness.   Abdominal:      General: Bowel sounds are normal. There is no distension.      Palpations: Abdomen is soft.      " Tenderness: There is no abdominal tenderness.      Hernia: No hernia is present.   Musculoskeletal:         General: Normal range of motion.      Cervical back: Normal range of motion and neck supple.   Lymphadenopathy:      Cervical: No cervical adenopathy.   Skin:     General: Skin is warm and dry.   Neurological:      General: No focal deficit present.      Mental Status: He is alert and oriented to person, place, and time.      Sensory: No sensory deficit.      Motor: Motor function is intact.      Coordination: Coordination is intact.      Gait: Gait is intact.      Deep Tendon Reflexes: Reflexes are normal and symmetric.   Psychiatric:         Mood and Affect: Mood normal. Mood is not anxious or depressed.         Speech: Speech normal.         Behavior: Behavior normal.         Thought Content: Thought content normal.         Judgment: Judgment normal.        Result Review :                  PHQ-9 Total Score: 0           Assessment and Plan    Diagnoses and all orders for this visit:    1. Annual physical exam (Primary)  Comments:  Anticipatory guidance was done    2. Seasonal allergies  Assessment & Plan:  Over-the-counter medication      3. Mixed hyperlipidemia  Assessment & Plan:   Lipid abnormalities are worsening    Plan:  Discussed diet.  Healthy diet website given.  May also be related to testosterone..      Discussed medication dosage, use, side effects, and goals of treatment in detail.    Counseled patient on lifestyle modifications to help control hyperlipidemia.     Patient Treatment Goals:     LDL goal is under 100    Followup in 3 months.      4. Acquired hypothyroidism  Assessment & Plan:  Stable on current dose      5. Low testosterone in male  Assessment & Plan:  Continue to follow-up with urology      6. Overweight with body mass index (BMI) of 25 to 25.9 in adult  Assessment & Plan:  Patient's (Body mass index is 25.66 kg/m².) indicates that they are overweight with health conditions that  include dyslipidemias . Weight is worsening. BMI is is above average; BMI management plan is completed. We discussed portion control and increasing exercise.            Problem List Items Addressed This Visit          Active Problems    Seasonal allergies    Current Assessment & Plan     Over-the-counter medication         Acquired hypothyroidism    Current Assessment & Plan     Stable on current dose         Low testosterone in male    Current Assessment & Plan     Continue to follow-up with urology         Overweight with body mass index (BMI) of 25 to 25.9 in adult    Current Assessment & Plan     Patient's (Body mass index is 25.66 kg/m².) indicates that they are overweight with health conditions that include dyslipidemias . Weight is worsening. BMI is is above average; BMI management plan is completed. We discussed portion control and increasing exercise.          Mixed hyperlipidemia    Current Assessment & Plan      Lipid abnormalities are worsening    Plan:  Discussed diet.  Healthy diet website given.  May also be related to testosterone..      Discussed medication dosage, use, side effects, and goals of treatment in detail.    Counseled patient on lifestyle modifications to help control hyperlipidemia.     Patient Treatment Goals:     LDL goal is under 100    Followup in 3 months.          Other Visit Diagnoses       Annual physical exam    -  Primary    Anticipatory guidance was done            Follow Up   Return in about 1 year (around 4/23/2025) for Annual physical.  Patient was given instructions and counseling regarding his condition or for health maintenance advice. Please see specific information pulled into the AVS if appropriate.

## 2024-04-23 NOTE — ASSESSMENT & PLAN NOTE
Patient's (Body mass index is 25.66 kg/m².) indicates that they are overweight with health conditions that include dyslipidemias . Weight is worsening. BMI is is above average; BMI management plan is completed. We discussed portion control and increasing exercise.

## 2024-05-13 RX ORDER — LEVOTHYROXINE SODIUM 0.05 MG/1
TABLET ORAL
Qty: 90 TABLET | Refills: 2 | Status: SHIPPED | OUTPATIENT
Start: 2024-05-13

## 2024-10-15 ENCOUNTER — OFFICE VISIT (OUTPATIENT)
Dept: FAMILY MEDICINE CLINIC | Facility: CLINIC | Age: 34
End: 2024-10-15
Payer: COMMERCIAL

## 2024-10-15 ENCOUNTER — HOSPITAL ENCOUNTER (OUTPATIENT)
Dept: GENERAL RADIOLOGY | Facility: HOSPITAL | Age: 34
Discharge: HOME | End: 2024-10-15
Admitting: NURSE PRACTITIONER
Payer: COMMERCIAL

## 2024-10-15 VITALS
BODY MASS INDEX: 25.48 KG/M2 | DIASTOLIC BLOOD PRESSURE: 81 MMHG | HEIGHT: 71 IN | RESPIRATION RATE: 16 BRPM | HEART RATE: 73 BPM | SYSTOLIC BLOOD PRESSURE: 135 MMHG | OXYGEN SATURATION: 99 % | WEIGHT: 182 LBS | TEMPERATURE: 97.9 F

## 2024-10-15 DIAGNOSIS — E66.3 OVERWEIGHT WITH BODY MASS INDEX (BMI) OF 25 TO 25.9 IN ADULT: ICD-10-CM

## 2024-10-15 DIAGNOSIS — S49.91XD INJURY OF RIGHT SHOULDER, SUBSEQUENT ENCOUNTER: Primary | ICD-10-CM

## 2024-10-15 DIAGNOSIS — S49.91XD INJURY OF RIGHT SHOULDER, SUBSEQUENT ENCOUNTER: ICD-10-CM

## 2024-10-15 PROBLEM — K60.2 RECTAL FISSURE: Status: RESOLVED | Noted: 2023-01-25 | Resolved: 2024-10-15

## 2024-10-15 PROBLEM — J10.1 INFLUENZA A: Status: RESOLVED | Noted: 2022-12-14 | Resolved: 2024-10-15

## 2024-10-15 PROCEDURE — 73030 X-RAY EXAM OF SHOULDER: CPT

## 2024-10-15 PROCEDURE — 99213 OFFICE O/P EST LOW 20 MIN: CPT | Performed by: NURSE PRACTITIONER

## 2024-10-15 NOTE — PROGRESS NOTES
"Chief Complaint  Shoulder Pain (Right, chronic for 10 or more years)    Subjective          Arnav Styles presents to [facility] for right shoulder pain    History of Present Illness      Patient reports right shoulder pain is within the joint.  He reports this gets worse with lifting.  Is been going on for approximately 10 years but is getting worse all the time.  He does occasionally have some tingling in his right arm when he lays on that side.  He has seen a chiropractor for his neck.  Has no other radicular symptoms.  He has not had a fall.  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  He reports that working on the gym sometimes hurts his shoulder as well.  Previously has had a steroid shot within the joint.  That helped for short while but then the pain returned     Arnav Styles  has a past medical history of Abnormal Holter monitor finding, Asthma, and Palpitations.      Review of Systems   Constitutional:  Negative for fatigue and fever.   Musculoskeletal:  Positive for arthralgias.        Objective       Current Outpatient Medications:     B-D 3CC LUER-JEROD SYR 22GX1\" 22G X 1\" 3 ML misc, See Admin Instructions., Disp: , Rfl:     BD Hypodermic Needle 18G X 1\" misc, See Admin Instructions., Disp: , Rfl:     levothyroxine (SYNTHROID, LEVOTHROID) 50 MCG tablet, Take 1 tablet by mouth once daily, Disp: 90 tablet, Rfl: 2    Testosterone Cypionate (DEPOTESTOTERONE CYPIONATE) 200 MG/ML injection, Inject 0.5 ml into the appropriate muscle as directed weekly, Disp: 4 mL, Rfl: 2    Vital Signs:      /81 (BP Location: Right arm, Patient Position: Sitting, Cuff Size: Small Adult)   Pulse 73   Temp 97.9 °F (36.6 °C) (Infrared)   Resp 16   Ht 180.3 cm (71\")   Wt 82.6 kg (182 lb)   SpO2 99%   BMI 25.38 kg/m²     Vitals:    10/15/24 0803   BP: 135/81   BP Location: Right arm   Patient Position: Sitting   Cuff Size: Small Adult   Pulse: 73   Resp: 16   Temp: " "97.9 °F (36.6 °C)   TempSrc: Infrared   SpO2: 99%   Weight: 82.6 kg (182 lb)   Height: 180.3 cm (71\")      Physical Exam  Vitals and nursing note reviewed.   Constitutional:       Appearance: He is well-developed.   Cardiovascular:      Rate and Rhythm: Normal rate and regular rhythm.      Heart sounds: Normal heart sounds. No murmur heard.     No friction rub. No gallop.   Pulmonary:      Effort: Pulmonary effort is normal.      Breath sounds: Normal breath sounds. No wheezing or rales.   Musculoskeletal:      Comments: Shoulder generalized tenderness anterior shoulder tender.  Pain with range of motion over 100 degrees.  C-spine nontender full range of motion.   Skin:     General: Skin is warm and dry.   Neurological:      Mental Status: He is alert.        Result Review :                  PHQ-9 Total Score:             Assessment and Plan    Diagnoses and all orders for this visit:    1. Injury of right shoulder, subsequent encounter (Primary)  Comments:  Declined medication.  Uses Advil as needed.  X-ray today.  Has been in chiropractic care.  Will progress to MRI due to length of time pain.  R/o labral tear  Orders:  -     XR Shoulder 2+ View Right; Future  -     MRI Shoulder Right Without Contrast; Future    2. Overweight with body mass index (BMI) of 25 to 25.9 in adult         Problem List Items Addressed This Visit          Active Problems    Overweight with body mass index (BMI) of 25 to 25.9 in adult     Other Visit Diagnoses       Injury of right shoulder, subsequent encounter    -  Primary    Declined medication.  Uses Advil as needed.  X-ray today.  Has been in chiropractic care.  Will progress to MRI due to length of time pain.  R/o labral tear    Relevant Orders    XR Shoulder 2+ View Right    MRI Shoulder Right Without Contrast            Follow Up   Return for Next scheduled follow up.  Patient was given instructions and counseling regarding his condition or for health maintenance advice. Please " see specific information pulled into the AVS if appropriate.

## 2025-02-17 RX ORDER — LEVOTHYROXINE SODIUM 50 UG/1
TABLET ORAL
Qty: 90 TABLET | Refills: 0 | Status: SHIPPED | OUTPATIENT
Start: 2025-02-17

## 2025-04-16 ENCOUNTER — TELEPHONE (OUTPATIENT)
Dept: FAMILY MEDICINE CLINIC | Facility: CLINIC | Age: 35
End: 2025-04-16
Payer: COMMERCIAL

## 2025-04-16 DIAGNOSIS — J30.2 SEASONAL ALLERGIES: ICD-10-CM

## 2025-04-16 DIAGNOSIS — E78.2 MIXED HYPERLIPIDEMIA: Primary | ICD-10-CM

## 2025-04-16 DIAGNOSIS — E03.9 ACQUIRED HYPOTHYROIDISM: ICD-10-CM

## 2025-04-16 NOTE — TELEPHONE ENCOUNTER
Patient advised to get fasting lab work done at [facility] at least 2 days prior to being seen in office if able.

## 2025-05-13 RX ORDER — LEVOTHYROXINE SODIUM 50 UG/1
50 TABLET ORAL DAILY
Qty: 30 TABLET | Refills: 0 | Status: SHIPPED | OUTPATIENT
Start: 2025-05-13

## 2025-05-16 ENCOUNTER — TELEPHONE (OUTPATIENT)
Dept: FAMILY MEDICINE CLINIC | Facility: CLINIC | Age: 35
End: 2025-05-16
Payer: COMMERCIAL

## 2025-06-04 LAB
ALBUMIN SERPL-MCNC: 4.3 G/DL (ref 4.1–5.1)
ALP SERPL-CCNC: 84 IU/L (ref 44–121)
ALT SERPL-CCNC: 27 IU/L (ref 0–44)
AST SERPL-CCNC: 19 IU/L (ref 0–40)
BASOPHILS # BLD AUTO: 0.1 X10E3/UL (ref 0–0.2)
BASOPHILS NFR BLD AUTO: 1 %
BILIRUB SERPL-MCNC: 1.2 MG/DL (ref 0–1.2)
BUN SERPL-MCNC: 17 MG/DL (ref 6–20)
BUN/CREAT SERPL: 11 (ref 9–20)
CALCIUM SERPL-MCNC: 9.8 MG/DL (ref 8.7–10.2)
CHLORIDE SERPL-SCNC: 103 MMOL/L (ref 96–106)
CHOLEST SERPL-MCNC: 212 MG/DL (ref 100–199)
CHOLEST/HDLC SERPL: 5.4 RATIO (ref 0–5)
CO2 SERPL-SCNC: 25 MMOL/L (ref 20–29)
CREAT SERPL-MCNC: 1.5 MG/DL (ref 0.76–1.27)
EGFRCR SERPLBLD CKD-EPI 2021: 62 ML/MIN/1.73
EOSINOPHIL # BLD AUTO: 0.3 X10E3/UL (ref 0–0.4)
EOSINOPHIL NFR BLD AUTO: 4 %
ERYTHROCYTE [DISTWIDTH] IN BLOOD BY AUTOMATED COUNT: 14.4 % (ref 11.6–15.4)
GLOBULIN SER CALC-MCNC: 2 G/DL (ref 1.5–4.5)
GLUCOSE SERPL-MCNC: 88 MG/DL (ref 70–99)
HCT VFR BLD AUTO: 51.6 % (ref 37.5–51)
HDLC SERPL-MCNC: 39 MG/DL
HGB BLD-MCNC: 16.6 G/DL (ref 13–17.7)
IMM GRANULOCYTES # BLD AUTO: 0 X10E3/UL (ref 0–0.1)
IMM GRANULOCYTES NFR BLD AUTO: 0 %
LDLC SERPL CALC-MCNC: 158 MG/DL (ref 0–99)
LYMPHOCYTES # BLD AUTO: 2.1 X10E3/UL (ref 0.7–3.1)
LYMPHOCYTES NFR BLD AUTO: 30 %
MCH RBC QN AUTO: 28.4 PG (ref 26.6–33)
MCHC RBC AUTO-ENTMCNC: 32.2 G/DL (ref 31.5–35.7)
MCV RBC AUTO: 88 FL (ref 79–97)
MONOCYTES # BLD AUTO: 0.6 X10E3/UL (ref 0.1–0.9)
MONOCYTES NFR BLD AUTO: 8 %
NEUTROPHILS # BLD AUTO: 3.8 X10E3/UL (ref 1.4–7)
NEUTROPHILS NFR BLD AUTO: 57 %
PLATELET # BLD AUTO: 222 X10E3/UL (ref 150–450)
POTASSIUM SERPL-SCNC: 4.7 MMOL/L (ref 3.5–5.2)
PROT SERPL-MCNC: 6.3 G/DL (ref 6–8.5)
RBC # BLD AUTO: 5.84 X10E6/UL (ref 4.14–5.8)
SODIUM SERPL-SCNC: 141 MMOL/L (ref 134–144)
TRIGL SERPL-MCNC: 81 MG/DL (ref 0–149)
TSH SERPL DL<=0.005 MIU/L-ACNC: 5.02 UIU/ML (ref 0.45–4.5)
VLDLC SERPL CALC-MCNC: 15 MG/DL (ref 5–40)
WBC # BLD AUTO: 6.8 X10E3/UL (ref 3.4–10.8)

## 2025-06-08 NOTE — PROGRESS NOTES
"Chief Complaint  Annual Exam, Hypothyroidism, and Hyperlipidemia    Subjective          Arnav Styles presents to [facility] for annual exam, hypothyroidism, hyperlipidemia, low testosterone    History of Present Illness    Patient is here for an annual exam.  He currently is on thyroid medication as well as testosterone.  In review of his labs today he indicates he has been taking protein and creatinine prior to workouts.  We have discussed stopping that due to the creatinine elevation.  Will recheck in 2 months.    Creatinine at 1.5.  GFR is 62.  If not hydration ultrasound of kidneys    Cholesterol panel: , HDL 39.  Discussed possible cholesterol medicine if not improving    TSH elevated will need recheck in 6 weeks.    Elevated hematocrit at 51.6 and RBCs at 5.84.  Patient is to donate blood and instruction on urologist due to testosterone.  He has not done that for a while    Works third shift.  Feels like he has water intake as well as food is okay.  Likely does not eat enough vegetables      Arnav Styles  has a past medical history of Asthma.      Review of Systems   Constitutional:  Negative for fatigue.   HENT:  Negative for ear pain, sinus pain and sore throat.    Eyes:  Negative for pain.   Respiratory:  Negative for cough and shortness of breath.    Cardiovascular:  Negative for chest pain.   Gastrointestinal:  Negative for abdominal pain, diarrhea and nausea.   Endocrine: Negative for polyuria.   Genitourinary:  Negative for decreased urine volume and dysuria.   Musculoskeletal:  Negative for arthralgias.   Skin:  Negative for rash.   Allergic/Immunologic: Negative for environmental allergies.   Neurological:  Negative for dizziness, numbness and headaches.   Hematological:  Does not bruise/bleed easily.   Psychiatric/Behavioral:  The patient is not nervous/anxious.         Objective       Current Outpatient Medications:     B-D 3CC LUER-JEROD SYR 22GX1\" 22G X " "1\" 3 ML misc, See Admin Instructions., Disp: , Rfl:     BD Hypodermic Needle 18G X 1\" misc, See Admin Instructions., Disp: , Rfl:     levothyroxine (SYNTHROID, LEVOTHROID) 75 MCG tablet, Take 1 tablet by mouth Daily. Increase in dosage, Disp: 90 tablet, Rfl: 0    Testosterone Cypionate (DEPOTESTOTERONE CYPIONATE) 200 MG/ML injection, Inject 0.5 ml into the appropriate muscle as directed weekly, Disp: 4 mL, Rfl: 2    Vital Signs:      /83 (BP Location: Right arm, Patient Position: Sitting, Cuff Size: Small Adult)   Pulse 69   Temp 97.8 °F (36.6 °C) (Infrared)   Resp 16   Ht 180.3 cm (71\")   Wt 82.6 kg (182 lb)   SpO2 100%   BMI 25.38 kg/m²     Vitals:    06/11/25 1249   BP: 129/83   BP Location: Right arm   Patient Position: Sitting   Cuff Size: Small Adult   Pulse: 69   Resp: 16   Temp: 97.8 °F (36.6 °C)   TempSrc: Infrared   SpO2: 100%   Weight: 82.6 kg (182 lb)   Height: 180.3 cm (71\")      Physical Exam  Vitals and nursing note reviewed.   Constitutional:       Appearance: Normal appearance. He is well-developed.   HENT:      Head: Normocephalic and atraumatic.      Right Ear: Tympanic membrane, ear canal and external ear normal.      Left Ear: Tympanic membrane, ear canal and external ear normal.      Nose: Nose normal.      Mouth/Throat:      Pharynx: Uvula midline.   Eyes:      General: Lids are normal.      Conjunctiva/sclera: Conjunctivae normal.      Pupils: Pupils are equal, round, and reactive to light.   Neck:      Thyroid: No thyroid mass or thyromegaly.      Vascular: No carotid bruit.   Cardiovascular:      Rate and Rhythm: Regular rhythm.      Heart sounds: Normal heart sounds, S1 normal and S2 normal. No murmur heard.     No friction rub. No gallop.   Pulmonary:      Effort: Pulmonary effort is normal.      Breath sounds: Normal breath sounds. No wheezing or rales.   Chest:      Chest wall: No mass or tenderness.   Breasts:     Right: No mass or tenderness.      Left: No mass or " tenderness.   Abdominal:      General: Bowel sounds are normal. There is no distension.      Palpations: Abdomen is soft.      Tenderness: There is no abdominal tenderness.      Hernia: No hernia is present. There is no hernia in the left inguinal area or right inguinal area.   Genitourinary:     Penis: Normal and circumcised.       Testes: Normal.         Right: Mass, tenderness or swelling not present.         Left: Mass, tenderness or swelling not present.      Prostate: Normal. Not enlarged and not tender.      Rectum: Normal. Guaiac result negative.   Musculoskeletal:      Cervical back: Normal range of motion and neck supple.      Comments: Full range of motion all extremities.  No joint swelling or tenderness   Lymphadenopathy:      Comments: No lymphadenopathy   Skin:     General: Skin is warm and dry.   Neurological:      Mental Status: He is alert and oriented to person, place, and time.      Cranial Nerves: No cranial nerve deficit.      Sensory: No sensory deficit.      Deep Tendon Reflexes: Reflexes are normal and symmetric.   Psychiatric:         Speech: Speech normal.         Behavior: Behavior normal.         Thought Content: Thought content normal.         Judgment: Judgment normal.        Result Review :     CMP          6/3/2025    07:38   CMP   Glucose 88    BUN 17    Creatinine 1.50    EGFR 62    Sodium 141    Potassium 4.7    Chloride 103    Calcium 9.8    Total Protein 6.3    Albumin 4.3    Globulin 2.0    Total Bilirubin 1.2    Alkaline Phosphatase 84    AST (SGOT) 19    ALT (SGPT) 27    BUN/Creatinine Ratio 11      CBC w/diff          6/3/2025    07:38   CBC w/Diff   WBC 6.8    RBC 5.84    Hemoglobin 16.6    Hematocrit 51.6    MCV 88    MCH 28.4    MCHC 32.2    RDW 14.4    Platelets 222    Neutrophil Rel % 57    Lymphocyte Rel % 30    Monocyte Rel % 8    Eosinophil Rel % 4    Basophil Rel % 1      Lipid Panel          6/3/2025    07:38   Lipid Panel   Total Cholesterol 212    Triglycerides  81    HDL Cholesterol 39    VLDL Cholesterol 15    LDL Cholesterol  158      TSH          6/3/2025    07:38   TSH   TSH 5.020                 Little interest or pleasure in doing things? Not at all   Feeling down, depressed, or hopeless? Not at all   PHQ-2 Total Score 0   Trouble falling or staying asleep, or sleeping too much? Not at all   Feeling tired or having little energy? Not at all   Poor appetite or overeating? Not at all   Feeling bad about yourself - or that you are a failure or have let yourself or your family down? Not at all   Trouble concentrating on things, such as reading the newspaper or watching television? Not at all   Moving or speaking so slowly that other people could have noticed? Or the opposite - being so fidgety or restless that you have been moving around a lot more than usual? Not at all     Thoughts that you would be better off dead, or of hurting yourself in some way? Not at all   PHQ-9 Total Score 0   If you checked off any problems, how difficult have these problems made it for you to do your work, take care of things at home, or get along with other people? Not difficult at all                 Assessment and Plan    Diagnoses and all orders for this visit:    1. Annual physical exam (Primary)  Comments:  Anticipatory guidance done    2. Mixed hyperlipidemia  Assessment & Plan:   Lipid abnormalities are worsening    Plan:  Discussed diet.  Healthy diet website given.  May also be related to testosterone..      Discussed medication dosage, use, side effects, and goals of treatment in detail.    Counseled patient on lifestyle modifications to help control hyperlipidemia.     Patient Treatment Goals:     LDL goal is under 100    Followup in 3 months.      3. Low testosterone in male  Assessment & Plan:  Continue to follow-up with urology      4. Acquired hypothyroidism  Assessment & Plan:  Increase dosage.  Rechecking in 6 weeks      5. Seasonal allergies  Assessment &  Plan:  Over-the-counter medication      6. Moderate persistent asthma, unspecified whether complicated    7. Overweight with body mass index (BMI) of 25 to 25.9 in adult  Assessment & Plan:  Patient's (Body mass index is 25.38 kg/m².) indicates that they are overweight with health conditions that include dyslipidemias . Weight is worsening. BMI is is above average; BMI management plan is completed. We discussed portion control and increasing exercise.       8. Elevated serum creatinine  Comments:  Avoid supplements of protein and creatine.  Recheck with next lab.  If still elevated may need ultrasound kidney         Problem List Items Addressed This Visit          Active Problems    Asthma    Seasonal allergies    Current Assessment & Plan   Over-the-counter medication         Acquired hypothyroidism    Current Assessment & Plan   Increase dosage.  Rechecking in 6 weeks         Low testosterone in male    Current Assessment & Plan   Continue to follow-up with urology         Overweight with body mass index (BMI) of 25 to 25.9 in adult    Current Assessment & Plan   Patient's (Body mass index is 25.38 kg/m².) indicates that they are overweight with health conditions that include dyslipidemias . Weight is worsening. BMI is is above average; BMI management plan is completed. We discussed portion control and increasing exercise.          Mixed hyperlipidemia    Current Assessment & Plan    Lipid abnormalities are worsening    Plan:  Discussed diet.  Healthy diet website given.  May also be related to testosterone..      Discussed medication dosage, use, side effects, and goals of treatment in detail.    Counseled patient on lifestyle modifications to help control hyperlipidemia.     Patient Treatment Goals:     LDL goal is under 100    Followup in 3 months.          Other Visit Diagnoses         Annual physical exam    -  Primary    Anticipatory guidance done      Elevated serum creatinine        Avoid supplements of  protein and creatine.  Recheck with next lab.  If still elevated may need ultrasound kidney            Follow Up   Return in about 1 year (around 6/11/2026) for Annual physical.  Patient was given instructions and counseling regarding his condition or for health maintenance advice. Please see specific information pulled into the AVS if appropriate.

## 2025-06-11 ENCOUNTER — OFFICE VISIT (OUTPATIENT)
Dept: FAMILY MEDICINE CLINIC | Facility: CLINIC | Age: 35
End: 2025-06-11
Payer: COMMERCIAL

## 2025-06-11 VITALS
BODY MASS INDEX: 25.48 KG/M2 | HEIGHT: 71 IN | HEART RATE: 69 BPM | TEMPERATURE: 97.8 F | DIASTOLIC BLOOD PRESSURE: 83 MMHG | RESPIRATION RATE: 16 BRPM | OXYGEN SATURATION: 100 % | WEIGHT: 182 LBS | SYSTOLIC BLOOD PRESSURE: 129 MMHG

## 2025-06-11 DIAGNOSIS — J30.2 SEASONAL ALLERGIES: ICD-10-CM

## 2025-06-11 DIAGNOSIS — R79.89 LOW TESTOSTERONE IN MALE: ICD-10-CM

## 2025-06-11 DIAGNOSIS — J45.40 MODERATE PERSISTENT ASTHMA, UNSPECIFIED WHETHER COMPLICATED: ICD-10-CM

## 2025-06-11 DIAGNOSIS — E03.9 ACQUIRED HYPOTHYROIDISM: ICD-10-CM

## 2025-06-11 DIAGNOSIS — E66.3 OVERWEIGHT WITH BODY MASS INDEX (BMI) OF 25 TO 25.9 IN ADULT: ICD-10-CM

## 2025-06-11 DIAGNOSIS — R79.89 ELEVATED SERUM CREATININE: ICD-10-CM

## 2025-06-11 DIAGNOSIS — E78.2 MIXED HYPERLIPIDEMIA: ICD-10-CM

## 2025-06-11 DIAGNOSIS — Z00.00 ANNUAL PHYSICAL EXAM: Primary | ICD-10-CM

## 2025-06-11 PROBLEM — F43.21 GRIEF: Status: RESOLVED | Noted: 2019-11-13 | Resolved: 2025-06-11

## 2025-06-11 PROCEDURE — 99395 PREV VISIT EST AGE 18-39: CPT | Performed by: NURSE PRACTITIONER

## 2025-06-11 NOTE — ASSESSMENT & PLAN NOTE
Patient's (Body mass index is 25.38 kg/m².) indicates that they are overweight with health conditions that include dyslipidemias . Weight is worsening. BMI is is above average; BMI management plan is completed. We discussed portion control and increasing exercise.

## 2025-07-15 ENCOUNTER — TELEPHONE (OUTPATIENT)
Dept: FAMILY MEDICINE CLINIC | Facility: CLINIC | Age: 35
End: 2025-07-15
Payer: COMMERCIAL

## 2025-07-15 DIAGNOSIS — R79.89 ELEVATED SERUM CREATININE: ICD-10-CM

## 2025-07-15 DIAGNOSIS — E03.9 ACQUIRED HYPOTHYROIDISM: Primary | ICD-10-CM

## 2025-08-02 LAB
ALBUMIN SERPL-MCNC: 4.4 G/DL (ref 4.1–5.1)
ALP SERPL-CCNC: 84 IU/L (ref 44–121)
ALT SERPL-CCNC: 24 IU/L (ref 0–44)
AST SERPL-CCNC: 19 IU/L (ref 0–40)
BILIRUB SERPL-MCNC: 1.1 MG/DL (ref 0–1.2)
BUN SERPL-MCNC: 23 MG/DL (ref 6–20)
BUN/CREAT SERPL: 16 (ref 9–20)
CALCIUM SERPL-MCNC: 8.9 MG/DL (ref 8.7–10.2)
CHLORIDE SERPL-SCNC: 102 MMOL/L (ref 96–106)
CO2 SERPL-SCNC: 22 MMOL/L (ref 20–29)
CREAT SERPL-MCNC: 1.46 MG/DL (ref 0.76–1.27)
EGFRCR SERPLBLD CKD-EPI 2021: 64 ML/MIN/1.73
GLOBULIN SER CALC-MCNC: 2.1 G/DL (ref 1.5–4.5)
GLUCOSE SERPL-MCNC: 84 MG/DL (ref 70–99)
POTASSIUM SERPL-SCNC: 4.8 MMOL/L (ref 3.5–5.2)
PROT SERPL-MCNC: 6.5 G/DL (ref 6–8.5)
SODIUM SERPL-SCNC: 139 MMOL/L (ref 134–144)
TSH SERPL DL<=0.005 MIU/L-ACNC: 4.47 UIU/ML (ref 0.45–4.5)

## 2025-08-22 ENCOUNTER — APPOINTMENT (OUTPATIENT)
Dept: GENERAL RADIOLOGY | Facility: HOSPITAL | Age: 35
End: 2025-08-22
Payer: COMMERCIAL

## 2025-08-22 ENCOUNTER — HOSPITAL ENCOUNTER (EMERGENCY)
Facility: HOSPITAL | Age: 35
Discharge: HOME | End: 2025-08-22
Payer: COMMERCIAL

## 2025-08-28 RX ORDER — LEVOTHYROXINE SODIUM 75 UG/1
75 TABLET ORAL DAILY
Qty: 90 TABLET | Refills: 0 | Status: SHIPPED | OUTPATIENT
Start: 2025-08-28